# Patient Record
Sex: FEMALE | Race: WHITE | Employment: FULL TIME | URBAN - METROPOLITAN AREA
[De-identification: names, ages, dates, MRNs, and addresses within clinical notes are randomized per-mention and may not be internally consistent; named-entity substitution may affect disease eponyms.]

---

## 2019-07-13 ENCOUNTER — HOSPITAL ENCOUNTER (EMERGENCY)
Facility: CLINIC | Age: 54
Discharge: HOME | End: 2019-07-13
Attending: STUDENT IN AN ORGANIZED HEALTH CARE EDUCATION/TRAINING PROGRAM | Admitting: STUDENT IN AN ORGANIZED HEALTH CARE EDUCATION/TRAINING PROGRAM
Payer: COMMERCIAL

## 2019-07-13 VITALS
RESPIRATION RATE: 18 BRPM | DIASTOLIC BLOOD PRESSURE: 84 MMHG | WEIGHT: 293 LBS | HEIGHT: 72 IN | TEMPERATURE: 97.8 F | OXYGEN SATURATION: 99 % | BODY MASS INDEX: 39.68 KG/M2 | SYSTOLIC BLOOD PRESSURE: 158 MMHG | HEART RATE: 85 BPM

## 2019-07-13 DIAGNOSIS — S03.00XA DISLOCATION OF TEMPOROMANDIBULAR JOINT, INITIAL ENCOUNTER: ICD-10-CM

## 2019-07-13 PROCEDURE — 99283 EMERGENCY DEPT VISIT LOW MDM: CPT

## 2019-07-13 PROCEDURE — 99283 EMERGENCY DEPT VISIT LOW MDM: CPT | Mod: Z6 | Performed by: STUDENT IN AN ORGANIZED HEALTH CARE EDUCATION/TRAINING PROGRAM

## 2019-07-13 ASSESSMENT — MIFFLIN-ST. JEOR: SCORE: 2444.09

## 2019-07-13 NOTE — ED AVS SNAPSHOT
[facility]  [address] [facility] [address] [facility] [address]  Phone:  [phone]  Fax:  [phone]                                    Steffanie Wan   MRN: [id number]    Department:  [facility]   Date of Visit:  7/13/2019           After Visit Summary Signature Page    I have received my discharge instructions, and my questions have been answered. I have discussed any challenges I see with this plan with the nurse or doctor.    ..........................................................................................................................................  Patient/Patient Representative Signature      ..........................................................................................................................................  Patient Representative Print Name and Relationship to Patient    ..................................................               ................................................  Date                                   Time    ..........................................................................................................................................  Reviewed by Signature/Title    ...................................................              ..............................................  Date                                               Time          22EPIC Rev 08/18

## 2019-07-13 NOTE — ED TRIAGE NOTES
Pt states she was brushing her teeth when all of a sudden she could not close her mouth. States discomfort is greater on the left side. Denies trauma and previous incidences like this.

## 2019-07-13 NOTE — ED PROVIDER NOTES
"  History     Chief Complaint   Patient presents with     Jaw Pain     Pt states jaw pain and unable to close jaw x1.5 hours. States was brushing her teeth.     HPI  Steffanie Wan is a 53 year old female who presents for evaluation of left-sided temporal mandibular pain and inability to close her mouth.  Patient describes her jaw getting stuck open well brushing her teeth 90 minutes prior to arrival.  She denies history of similar symptoms in the past, no previous diagnosis of TMJ, and had been feeling well prior to tonight's incident.  No other injuries or trauma.    Allergies:  Allergies   Allergen Reactions     Codeine      Msg [Monosodium Glutamate]      [place] Oil [Fish Oil]        Problem List:    There are no active problems to display for this patient.       Past Medical History:    No past medical history on file.    Past Surgical History:    No past surgical history on file.    Family History:    No family history on file.    Social History:  Marital Status:  Single [1]  Social History     Tobacco Use     Smoking status: Not on file   Substance Use Topics     Alcohol use: Not on file     Drug use: Not on file        Medications:      No current outpatient medications on file.      Review of Systems  Constitutional:  Negative for fever or recent illness.  HENT: Positive for inability to close mandible.  Neurological:  Negative for headache.    All others reviewed and are negative.      Physical Exam   BP: 158/84  Pulse: 85  Temp: 97.8  F (36.6  C)  Resp: 18  Height: 189.2 cm (6' 2.5\")  Weight: (!) 167.7 kg (369 lb 12.8 oz)  SpO2: 97 %      Physical Exam  Constitutional:  Well developed, well nourished.  Appears nontoxic and in no acute distress.   HENT:  Normocephalic and atraumatic.  Eyes:  Conjunctivae are normal.  Oral: Patient appears to have a dislocated mandible with tenderness along the left TMJ.  Moist oral mucosa.  Negative pharyngeal erythema.  No exudate or soft palate petechiae.  No " uvular asymmetry.    Neck:  Neck supple.  Cardiovascular:  No cyanosis.   Respiratory:  Effort normal, no respiratory distress.   Musculoskeletal:  Moves extremities spontaneously.  Neurological:  Patient is alert.  Skin:  Skin is warm and dry.  Psychiatric:  Normal mood and affect.      ED Course        Procedures               Critical Care time:  none               No results found for this or any previous visit (from the past 24 hour(s)).    Medications - No data to display    Assessments & Plan (with Medical Decision Making)   Steffanie Wan is a 53 year old female who presents to the department for evaluation of mandibular dislocation.  The incident occurred while she was opening her mouth widely to brush her teeth prior to arrival.  The mandible was easily reduced in the department and patient was able to range afterwards.  Instructed her to refrain from opening widely over the next few days, follow-up with local dentist, and return to the department for any developing symptoms or concerns.        Disclaimer:  This note consists of symbols derived from keyboarding, dictation, and/or voice recognition software.  As a result, there may be errors in the script that have gone undetected.  Please consider this when interpreting information found in the chart.        I have reviewed the nursing notes.    I have reviewed the findings, diagnosis, plan and need for follow up with the patient.          Medication List      There are no discharge medications for this visit.         Final diagnoses:   Dislocation of temporomandibular joint, initial encounter       7/13/2019   [facility] EMERGENCY DEPARTMENT     Dmitry Davis,   07/13/19 0445

## 2021-12-02 ENCOUNTER — HOSPITAL ENCOUNTER (INPATIENT)
Facility: CLINIC | Age: 56
LOS: 4 days | Discharge: HOME | End: 2021-12-06
Attending: NURSE PRACTITIONER | Admitting: HOSPITALIST
Payer: COMMERCIAL

## 2021-12-02 ENCOUNTER — APPOINTMENT (OUTPATIENT)
Dept: GENERAL RADIOLOGY | Facility: CLINIC | Age: 56
End: 2021-12-02
Attending: INTERNAL MEDICINE
Payer: COMMERCIAL

## 2021-12-02 ENCOUNTER — APPOINTMENT (OUTPATIENT)
Dept: CT IMAGING | Facility: CLINIC | Age: 56
End: 2021-12-02
Attending: NURSE PRACTITIONER
Payer: COMMERCIAL

## 2021-12-02 DIAGNOSIS — J96.00 ACUTE RESPIRATORY FAILURE DUE TO COVID-19 (H): ICD-10-CM

## 2021-12-02 DIAGNOSIS — U07.1 ACUTE RESPIRATORY FAILURE DUE TO COVID-19 (H): ICD-10-CM

## 2021-12-02 DIAGNOSIS — R09.02 HYPOXIA: ICD-10-CM

## 2021-12-02 DIAGNOSIS — U07.1 INFECTION DUE TO 2019 NOVEL CORONAVIRUS: ICD-10-CM

## 2021-12-02 PROBLEM — M65.28 CALCIFIC ACHILLES TENDINITIS: Status: ACTIVE | Noted: 2019-05-13

## 2021-12-02 LAB
ALBUMIN SERPL-MCNC: 3.2 G/DL (ref 3.4–5)
ALP SERPL-CCNC: 55 U/L (ref 40–150)
ALT SERPL W P-5'-P-CCNC: 36 U/L (ref 0–50)
ANION GAP SERPL CALCULATED.3IONS-SCNC: 9 MMOL/L (ref 3–14)
AST SERPL W P-5'-P-CCNC: 39 U/L (ref 0–45)
BASE EXCESS BLDV CALC-SCNC: 3.3 MMOL/L (ref -7.7–1.9)
BASOPHILS # BLD AUTO: 0 10E3/UL (ref 0–0.2)
BASOPHILS NFR BLD AUTO: 0 %
BILIRUB SERPL-MCNC: 0.9 MG/DL (ref 0.2–1.3)
BUN SERPL-MCNC: 24 MG/DL (ref 7–30)
CALCIUM SERPL-MCNC: 8.7 MG/DL (ref 8.5–10.1)
CHLORIDE BLD-SCNC: 99 MMOL/L (ref 94–109)
CO2 SERPL-SCNC: 27 MMOL/L (ref 20–32)
CREAT SERPL-MCNC: 1.39 MG/DL (ref 0.52–1.04)
CRP SERPL-MCNC: 90.6 MG/L (ref 0–8)
D DIMER PPP FEU-MCNC: 0.89 UG/ML FEU (ref 0–0.5)
EOSINOPHIL # BLD AUTO: 0 10E3/UL (ref 0–0.7)
EOSINOPHIL NFR BLD AUTO: 0 %
ERYTHROCYTE [DISTWIDTH] IN BLOOD BY AUTOMATED COUNT: 14.1 % (ref 10–15)
FERRITIN SERPL-MCNC: 1036 NG/ML (ref 8–252)
FIBRINOGEN PPP-MCNC: 471 MG/DL (ref 170–490)
FLUAV RNA SPEC QL NAA+PROBE: NEGATIVE
FLUBV RNA RESP QL NAA+PROBE: NEGATIVE
GFR SERPL CREATININE-BSD FRML MDRD: 43 ML/MIN/1.73M2
GLUCOSE BLD-MCNC: 112 MG/DL (ref 70–99)
HCO3 BLDV-SCNC: 28 MMOL/L (ref 21–28)
HCT VFR BLD AUTO: 45 % (ref 35–47)
HGB BLD-MCNC: 15.2 G/DL (ref 11.7–15.7)
IMM GRANULOCYTES # BLD: 0 10E3/UL
IMM GRANULOCYTES NFR BLD: 0 %
LACTATE SERPL-SCNC: 1.5 MMOL/L (ref 0.7–2)
LDH SERPL L TO P-CCNC: 451 U/L (ref 81–234)
LYMPHOCYTES # BLD AUTO: 1.2 10E3/UL (ref 0.8–5.3)
LYMPHOCYTES NFR BLD AUTO: 29 %
MAGNESIUM SERPL-MCNC: 2.5 MG/DL (ref 1.6–2.3)
MCH RBC QN AUTO: 29.2 PG (ref 26.5–33)
MCHC RBC AUTO-ENTMCNC: 33.8 G/DL (ref 31.5–36.5)
MCV RBC AUTO: 86 FL (ref 78–100)
MONOCYTES # BLD AUTO: 0.3 10E3/UL (ref 0–1.3)
MONOCYTES NFR BLD AUTO: 8 %
NEUTROPHILS # BLD AUTO: 2.5 10E3/UL (ref 1.6–8.3)
NEUTROPHILS NFR BLD AUTO: 63 %
NRBC # BLD AUTO: 0 10E3/UL
NRBC BLD AUTO-RTO: 0 /100
O2/TOTAL GAS SETTING VFR VENT: 28 %
PCO2 BLDV: 41 MM HG (ref 40–50)
PH BLDV: 7.44 [PH] (ref 7.32–7.43)
PLATELET # BLD AUTO: 112 10E3/UL (ref 150–450)
PO2 BLDV: 39 MM HG (ref 25–47)
POTASSIUM BLD-SCNC: 3.8 MMOL/L (ref 3.4–5.3)
PROT SERPL-MCNC: 7.8 G/DL (ref 6.8–8.8)
RBC # BLD AUTO: 5.21 10E6/UL (ref 3.8–5.2)
SARS-COV-2 RNA RESP QL NAA+PROBE: POSITIVE
SODIUM SERPL-SCNC: 135 MMOL/L (ref 133–144)
TROPONIN I SERPL HS-MCNC: 14 NG/L
TSH SERPL DL<=0.005 MIU/L-ACNC: 1.13 MU/L (ref 0.4–4)
WBC # BLD AUTO: 4 10E3/UL (ref 4–11)

## 2021-12-02 PROCEDURE — 85384 FIBRINOGEN ACTIVITY: CPT | Performed by: INTERNAL MEDICINE

## 2021-12-02 PROCEDURE — 96361 HYDRATE IV INFUSION ADD-ON: CPT

## 2021-12-02 PROCEDURE — 120N000001 HC R&B MED SURG/OB

## 2021-12-02 PROCEDURE — 271N000002 HC RX 271: Performed by: NURSE PRACTITIONER

## 2021-12-02 PROCEDURE — 99285 EMERGENCY DEPT VISIT HI MDM: CPT | Mod: 25

## 2021-12-02 PROCEDURE — 96375 TX/PRO/DX INJ NEW DRUG ADDON: CPT

## 2021-12-02 PROCEDURE — 71045 X-RAY EXAM CHEST 1 VIEW: CPT

## 2021-12-02 PROCEDURE — 83615 LACTATE (LD) (LDH) ENZYME: CPT | Performed by: NURSE PRACTITIONER

## 2021-12-02 PROCEDURE — 83735 ASSAY OF MAGNESIUM: CPT | Performed by: NURSE PRACTITIONER

## 2021-12-02 PROCEDURE — 84484 ASSAY OF TROPONIN QUANT: CPT | Performed by: NURSE PRACTITIONER

## 2021-12-02 PROCEDURE — 36415 COLL VENOUS BLD VENIPUNCTURE: CPT | Performed by: NURSE PRACTITIONER

## 2021-12-02 PROCEDURE — 83605 ASSAY OF LACTIC ACID: CPT | Performed by: NURSE PRACTITIONER

## 2021-12-02 PROCEDURE — 85025 COMPLETE CBC W/AUTO DIFF WBC: CPT | Performed by: NURSE PRACTITIONER

## 2021-12-02 PROCEDURE — C9803 HOPD COVID-19 SPEC COLLECT: HCPCS

## 2021-12-02 PROCEDURE — 71275 CT ANGIOGRAPHY CHEST: CPT

## 2021-12-02 PROCEDURE — 84145 PROCALCITONIN (PCT): CPT | Performed by: NURSE PRACTITIONER

## 2021-12-02 PROCEDURE — 82803 BLOOD GASES ANY COMBINATION: CPT | Performed by: NURSE PRACTITIONER

## 2021-12-02 PROCEDURE — 86140 C-REACTIVE PROTEIN: CPT | Performed by: NURSE PRACTITIONER

## 2021-12-02 PROCEDURE — 99285 EMERGENCY DEPT VISIT HI MDM: CPT | Performed by: EMERGENCY MEDICINE

## 2021-12-02 PROCEDURE — 250N000013 HC RX MED GY IP 250 OP 250 PS 637: Performed by: NURSE PRACTITIONER

## 2021-12-02 PROCEDURE — 258N000003 HC RX IP 258 OP 636: Performed by: NURSE PRACTITIONER

## 2021-12-02 PROCEDURE — 84443 ASSAY THYROID STIM HORMONE: CPT | Performed by: NURSE PRACTITIONER

## 2021-12-02 PROCEDURE — 87636 SARSCOV2 & INF A&B AMP PRB: CPT | Performed by: NURSE PRACTITIONER

## 2021-12-02 PROCEDURE — 250N000011 HC RX IP 250 OP 636: Performed by: NURSE PRACTITIONER

## 2021-12-02 PROCEDURE — 80053 COMPREHEN METABOLIC PANEL: CPT | Performed by: NURSE PRACTITIONER

## 2021-12-02 PROCEDURE — 250N000009 HC RX 250: Performed by: NURSE PRACTITIONER

## 2021-12-02 PROCEDURE — 82728 ASSAY OF FERRITIN: CPT | Performed by: NURSE PRACTITIONER

## 2021-12-02 PROCEDURE — 85379 FIBRIN DEGRADATION QUANT: CPT | Performed by: NURSE PRACTITIONER

## 2021-12-02 PROCEDURE — 96374 THER/PROPH/DIAG INJ IV PUSH: CPT

## 2021-12-02 RX ORDER — IOPAMIDOL 755 MG/ML
96 INJECTION, SOLUTION INTRAVASCULAR ONCE
Status: COMPLETED | OUTPATIENT
Start: 2021-12-02 | End: 2021-12-02

## 2021-12-02 RX ORDER — ACETAMINOPHEN 500 MG
1000 TABLET ORAL ONCE
Status: COMPLETED | OUTPATIENT
Start: 2021-12-02 | End: 2021-12-02

## 2021-12-02 RX ORDER — LEVOTHYROXINE SODIUM 100 UG/1
100 TABLET ORAL DAILY
COMMUNITY

## 2021-12-02 RX ORDER — ATORVASTATIN CALCIUM 20 MG/1
20 TABLET, FILM COATED ORAL AT BEDTIME
COMMUNITY
Start: 2021-11-02

## 2021-12-02 RX ORDER — GUAIFENESIN 600 MG/1
1200 TABLET, EXTENDED RELEASE ORAL ONCE
Status: COMPLETED | OUTPATIENT
Start: 2021-12-02 | End: 2021-12-02

## 2021-12-02 RX ORDER — DEXAMETHASONE SODIUM PHOSPHATE 10 MG/ML
6 INJECTION, SOLUTION INTRAMUSCULAR; INTRAVENOUS ONCE
Status: COMPLETED | OUTPATIENT
Start: 2021-12-02 | End: 2021-12-02

## 2021-12-02 RX ORDER — ALBUTEROL SULFATE 90 UG/1
6 AEROSOL, METERED RESPIRATORY (INHALATION) EVERY 6 HOURS PRN
Status: DISCONTINUED | OUTPATIENT
Start: 2021-12-02 | End: 2021-12-06 | Stop reason: HOSPADM

## 2021-12-02 RX ORDER — ONDANSETRON 4 MG/1
4 TABLET, ORALLY DISINTEGRATING ORAL ONCE
Status: COMPLETED | OUTPATIENT
Start: 2021-12-02 | End: 2021-12-02

## 2021-12-02 RX ORDER — INHALER, ASSIST DEVICES
1 SPACER (EA) MISCELLANEOUS ONCE
Status: COMPLETED | OUTPATIENT
Start: 2021-12-02 | End: 2021-12-02

## 2021-12-02 RX ADMIN — IOPAMIDOL 96 ML: 755 INJECTION, SOLUTION INTRAVENOUS at 21:52

## 2021-12-02 RX ADMIN — Medication 1 EACH: at 19:31

## 2021-12-02 RX ADMIN — SODIUM CHLORIDE 100 ML: 9 INJECTION, SOLUTION INTRAVENOUS at 21:52

## 2021-12-02 RX ADMIN — ACETAMINOPHEN 1000 MG: 500 TABLET, FILM COATED ORAL at 19:34

## 2021-12-02 RX ADMIN — ONDANSETRON 4 MG: 4 TABLET, ORALLY DISINTEGRATING ORAL at 19:30

## 2021-12-02 RX ADMIN — DEXAMETHASONE SODIUM PHOSPHATE 6 MG: 10 INJECTION INTRAMUSCULAR; INTRAVENOUS at 21:09

## 2021-12-02 RX ADMIN — ALBUTEROL SULFATE 6 PUFF: 90 AEROSOL, METERED RESPIRATORY (INHALATION) at 19:31

## 2021-12-02 RX ADMIN — GUAIFENESIN 1200 MG: 600 TABLET ORAL at 19:33

## 2021-12-02 RX ADMIN — SODIUM CHLORIDE 500 ML: 9 INJECTION, SOLUTION INTRAVENOUS at 21:25

## 2021-12-02 ASSESSMENT — MIFFLIN-ST. JEOR: SCORE: 2437.94

## 2021-12-02 ASSESSMENT — ACTIVITIES OF DAILY LIVING (ADL)
ADLS_ACUITY_SCORE: 5

## 2021-12-02 NOTE — ED TRIAGE NOTES
States she feels very ill with body aches/ nausea and vomiting for the lat 10 days. She denies fevers. Not Covid vaccinated and cannot be tested for Yazdanism reasons. Active cough.

## 2021-12-03 LAB — PROCALCITONIN SERPL-MCNC: 0.09 NG/ML

## 2021-12-03 PROCEDURE — 120N000001 HC R&B MED SURG/OB

## 2021-12-03 PROCEDURE — 250N000012 HC RX MED GY IP 250 OP 636 PS 637: Performed by: NURSE PRACTITIONER

## 2021-12-03 PROCEDURE — 250N000013 HC RX MED GY IP 250 OP 250 PS 637: Performed by: EMERGENCY MEDICINE

## 2021-12-03 PROCEDURE — 250N000009 HC RX 250: Performed by: FAMILY MEDICINE

## 2021-12-03 PROCEDURE — 250N000011 HC RX IP 250 OP 636: Performed by: EMERGENCY MEDICINE

## 2021-12-03 RX ORDER — ACETAMINOPHEN 500 MG
1000 TABLET ORAL EVERY 6 HOURS PRN
Status: DISCONTINUED | OUTPATIENT
Start: 2021-12-03 | End: 2021-12-06 | Stop reason: HOSPADM

## 2021-12-03 RX ORDER — GUAIFENESIN 600 MG/1
1200 TABLET, EXTENDED RELEASE ORAL 2 TIMES DAILY PRN
Status: DISCONTINUED | OUTPATIENT
Start: 2021-12-03 | End: 2021-12-06 | Stop reason: HOSPADM

## 2021-12-03 RX ORDER — TIMOLOL MALEATE 2.5 MG/ML
1 SOLUTION/ DROPS OPHTHALMIC 2 TIMES DAILY
Status: DISCONTINUED | OUTPATIENT
Start: 2021-12-03 | End: 2021-12-06 | Stop reason: HOSPADM

## 2021-12-03 RX ORDER — ATORVASTATIN CALCIUM 20 MG/1
20 TABLET, FILM COATED ORAL EVERY EVENING
Status: DISCONTINUED | OUTPATIENT
Start: 2021-12-03 | End: 2021-12-04

## 2021-12-03 RX ORDER — DROPERIDOL 2.5 MG/ML
1.25 INJECTION, SOLUTION INTRAMUSCULAR; INTRAVENOUS ONCE
Status: COMPLETED | OUTPATIENT
Start: 2021-12-03 | End: 2021-12-03

## 2021-12-03 RX ORDER — TIMOLOL MALEATE 2.5 MG/ML
1 SOLUTION/ DROPS OPHTHALMIC 2 TIMES DAILY
COMMUNITY

## 2021-12-03 RX ORDER — INHALER, ASSIST DEVICES
1 SPACER (EA) MISCELLANEOUS ONCE
Status: COMPLETED | OUTPATIENT
Start: 2021-12-03 | End: 2021-12-03

## 2021-12-03 RX ORDER — ATORVASTATIN CALCIUM 20 MG/1
20 TABLET, FILM COATED ORAL AT BEDTIME
Status: DISCONTINUED | OUTPATIENT
Start: 2021-12-03 | End: 2021-12-03

## 2021-12-03 RX ORDER — LEVOTHYROXINE SODIUM 100 UG/1
100 TABLET ORAL
Status: DISCONTINUED | OUTPATIENT
Start: 2021-12-03 | End: 2021-12-06 | Stop reason: HOSPADM

## 2021-12-03 RX ORDER — LEVOTHYROXINE SODIUM 100 UG/1
100 TABLET ORAL DAILY
Status: DISCONTINUED | OUTPATIENT
Start: 2021-12-03 | End: 2021-12-03

## 2021-12-03 RX ORDER — ACETAMINOPHEN 500 MG
500 TABLET ORAL ONCE
Status: COMPLETED | OUTPATIENT
Start: 2021-12-03 | End: 2021-12-03

## 2021-12-03 RX ORDER — ONDANSETRON 4 MG/1
4 TABLET, ORALLY DISINTEGRATING ORAL EVERY 6 HOURS PRN
Status: DISCONTINUED | OUTPATIENT
Start: 2021-12-03 | End: 2021-12-06 | Stop reason: HOSPADM

## 2021-12-03 RX ORDER — DEXAMETHASONE SODIUM PHOSPHATE 10 MG/ML
6 INJECTION, SOLUTION INTRAMUSCULAR; INTRAVENOUS ONCE
Status: DISCONTINUED | OUTPATIENT
Start: 2021-12-04 | End: 2021-12-03

## 2021-12-03 RX ADMIN — ATORVASTATIN CALCIUM 20 MG: 20 TABLET, FILM COATED ORAL at 19:01

## 2021-12-03 RX ADMIN — DROPERIDOL 1.25 MG: 2.5 INJECTION, SOLUTION INTRAMUSCULAR; INTRAVENOUS at 05:49

## 2021-12-03 RX ADMIN — ENOXAPARIN SODIUM 40 MG: 100 INJECTION SUBCUTANEOUS at 19:02

## 2021-12-03 RX ADMIN — TIMOLOL MALEATE 1 DROP: 2.5 SOLUTION/ DROPS OPHTHALMIC at 20:39

## 2021-12-03 RX ADMIN — Medication 1 EACH: at 20:38

## 2021-12-03 RX ADMIN — ALBUTEROL SULFATE 6 PUFF: 90 AEROSOL, METERED RESPIRATORY (INHALATION) at 20:40

## 2021-12-03 RX ADMIN — LEVOTHYROXINE SODIUM 100 MCG: 100 TABLET ORAL at 08:57

## 2021-12-03 RX ADMIN — ACETAMINOPHEN 500 MG: 500 TABLET, FILM COATED ORAL at 01:47

## 2021-12-03 RX ADMIN — DEXAMETHASONE 6 MG: 2 TABLET ORAL at 20:38

## 2021-12-03 RX ADMIN — ENOXAPARIN SODIUM 40 MG: 100 INJECTION SUBCUTANEOUS at 08:58

## 2021-12-03 ASSESSMENT — ACTIVITIES OF DAILY LIVING (ADL)
ADLS_ACUITY_SCORE: 5

## 2021-12-03 ASSESSMENT — MIFFLIN-ST. JEOR
SCORE: 2437.94
SCORE: 2048.76

## 2021-12-03 NOTE — ED NOTES
Talked to Zahraa Crow CNP about the current blood pressure, diaphoresis and feeling ill and lightheaded. Orders received for IV fluids.

## 2021-12-03 NOTE — ED PROVIDER NOTES
"     Emergency Department Patient Sign-out       Brief HPI:  This is a 56 year old female signed out to me by Dr. Chandler .  See initial ED Provider note for details of the presentation.     Presents patient with underlying obesity, who is traveling to the area diagnosed with Covid pneumonia.  Has hypoxia.  Tolerating O2 3 L without significant desaturation.  Has received Decadron.  No remdesivir.  Pending bed      Significant Events prior to my assuming care:      Exam:   Patient Vitals for the past 24 hrs:   BP Temp Temp src Pulse Resp SpO2 Height Weight   12/03/21 1442 -- -- -- -- -- 92 % -- --   12/03/21 1415 -- -- -- -- -- (!) 88 % -- --   12/03/21 1400 -- -- -- -- -- 92 % -- --   12/03/21 1300 -- -- -- -- -- 94 % -- --   12/03/21 1200 -- -- -- -- -- 91 % -- --   12/03/21 1100 -- -- -- -- -- 92 % -- --   12/03/21 1000 -- -- -- -- -- 94 % -- --   12/03/21 0900 125/62 -- -- 84 -- 92 % -- --   12/03/21 0730 -- -- -- -- -- 93 % -- --   12/03/21 0715 -- -- -- -- -- 91 % -- --   12/03/21 0700 -- -- -- -- -- 92 % -- --   12/03/21 0645 -- -- -- -- -- 91 % -- --   12/03/21 0630 -- -- -- -- -- 97 % -- --   12/03/21 0445 -- -- -- -- -- 93 % -- --   12/03/21 0430 -- -- -- -- -- 95 % -- --   12/03/21 0330 -- -- -- -- -- 93 % -- --   12/03/21 0315 -- -- -- -- -- 93 % -- --   12/03/21 0300 -- -- -- -- -- 95 % -- --   12/03/21 0249 -- -- -- -- -- -- 1.905 m (6' 3\") (!) 167.8 kg (370 lb)   12/03/21 0248 -- -- -- -- -- -- 1.651 m (5' 5\") 144.8 kg (319 lb 3.2 oz)   12/03/21 0245 -- -- -- -- -- 92 % -- --   12/03/21 0238 -- 97.8  F (36.6  C) Tympanic -- -- 94 % -- --   12/03/21 0230 -- -- -- -- -- 92 % -- --   12/03/21 0215 -- -- -- -- -- 95 % -- --   12/03/21 0200 -- -- -- -- -- 97 % -- --   12/03/21 0145 107/64 -- -- -- 20 96 % -- --   12/03/21 0130 -- -- -- -- -- 92 % -- --   12/03/21 0115 -- -- -- -- -- 92 % -- --   12/03/21 0100 -- 100.4  F (38  C) Oral -- -- 95 % -- --   12/03/21 0045 -- -- -- -- -- 92 % -- --   12/03/21 0030 " "-- -- -- -- -- 95 % -- --   12/03/21 0015 116/66 -- -- -- -- 92 % -- --   12/03/21 0000 (!) 89/40 -- -- 78 -- 95 % -- --   12/02/21 2345 113/61 -- -- 77 -- 97 % -- --   12/02/21 2330 117/52 -- -- 78 -- 93 % -- --   12/02/21 2315 111/49 -- -- 74 -- 94 % -- --   12/02/21 2300 113/66 -- -- 78 -- 90 % -- --   12/02/21 2245 119/55 -- -- 78 -- 95 % -- --   12/02/21 2230 100/48 -- -- 76 -- 97 % -- --   12/02/21 2227 100/48 -- -- 76 20 96 % -- --   12/02/21 2150 -- -- -- -- -- 94 % -- --   12/02/21 2145 100/53 -- -- 84 -- 97 % -- --   12/02/21 2130 108/54 -- -- 86 20 96 % -- --   12/02/21 2113 (!) 89/59 -- -- 88 20 95 % -- --   12/02/21 2112 (!) 81/51 -- -- 82 20 95 % -- --   12/02/21 2110 (!) 82/41 -- -- 81 20 96 % -- --   12/02/21 2054 128/53 -- -- 92 20 93 % -- --   12/02/21 2001 -- -- -- -- 20 92 % -- --   12/02/21 1958 -- -- -- -- 20 92 % -- --   12/02/21 1951 -- -- -- -- -- 91 % -- --   12/02/21 1950 -- -- -- -- 18 (!) 88 % -- --   12/02/21 1947 -- -- -- -- 18 (!) 87 % -- --   12/02/21 1946 -- -- -- -- 18 90 % -- --   12/02/21 1934 -- -- -- -- -- 95 % -- --   12/02/21 1930 -- -- -- -- -- 91 % -- --   12/02/21 1929 -- -- -- -- -- 90 % -- --   12/02/21 1915 -- -- -- -- -- 91 % -- --   12/02/21 1900 -- -- -- -- -- 92 % -- --   12/02/21 1726 105/59 99  F (37.2  C) Oral 97 20 93 % 1.905 m (6' 3\") (!) 167.8 kg (370 lb)           ED RESULTS:   Results for orders placed or performed during the hospital encounter of 12/02/21 (from the past 24 hour(s))   Symptomatic Influenza A/B & SARS-CoV2 (COVID-19) Virus PCR Multiplex Nasopharyngeal     Status: Abnormal    Collection Time: 12/02/21  6:39 PM    Specimen: Nasopharyngeal; Swab   Result Value Ref Range    Influenza A PCR Negative Negative    Influenza B PCR Negative Negative    SARS CoV2 PCR Positive (A) Negative    Narrative    Testing was performed using the laura SARS-CoV-2 & Influenza A/B Assay on the laura Karen System. This test should be ordered for the detection of " SARS-CoV-2 and influenza viruses in individuals who meet clinical and/or epidemiological criteria. Test performance is unknown in asymptomatic patients. This test is for in vitro diagnostic use under the FDA EUA for laboratories certified under CLIA to perform moderate and/or high complexity testing. This test has not been FDA cleared or approved. A negative result does not rule out the presence of PCR inhibitors in the specimen or target RNA in concentration below the limit of detection for the assay. If only one viral target is positive but coinfection with multiple targets is suspected, the sample should be re-tested with another FDA cleared, approved or authorized test, if coinfection would change clinical management. [facility] are certified under the Clinical Laboratory Improvement Amendments of 1988 (CLIA-88) as  qualified to perform moderate and/or high complexity laboratory testing.   Lactic acid whole blood     Status: Normal    Collection Time: 12/02/21  9:03 PM   Result Value Ref Range    Lactic Acid 1.5 0.7 - 2.0 mmol/L   Blood gas venous     Status: Abnormal    Collection Time: 12/02/21  9:03 PM   Result Value Ref Range    pH Venous 7.44 (H) 7.32 - 7.43    pCO2 Venous 41 40 - 50 mm Hg    pO2 Venous 39 25 - 47 mm Hg    Bicarbonate Venous 28 21 - 28 mmol/L    Base Excess/Deficit (+/-) 3.3 (H) -7.7 - 1.9 mmol/L    FIO2 28    CBC with platelets differential     Status: Abnormal    Collection Time: 12/02/21  9:04 PM    Narrative    The following orders were created for panel order CBC with platelets differential.  Procedure                               Abnormality         Status                     ---------                               -----------         ------                     CBC with platelets and d...[[id number]]  Abnormal            Final result                 Please view results for these tests on the individual orders.   Comprehensive metabolic panel     Status: Abnormal     Collection Time: 12/02/21  9:04 PM   Result Value Ref Range    Sodium 135 133 - 144 mmol/L    Potassium 3.8 3.4 - 5.3 mmol/L    Chloride 99 94 - 109 mmol/L    Carbon Dioxide (CO2) 27 20 - 32 mmol/L    Anion Gap 9 3 - 14 mmol/L    Urea Nitrogen 24 7 - 30 mg/dL    Creatinine 1.39 (H) 0.52 - 1.04 mg/dL    Calcium 8.7 8.5 - 10.1 mg/dL    Glucose 112 (H) 70 - 99 mg/dL    Alkaline Phosphatase 55 40 - 150 U/L    AST 39 0 - 45 U/L    ALT 36 0 - 50 U/L    Protein Total 7.8 6.8 - 8.8 g/dL    Albumin 3.2 (L) 3.4 - 5.0 g/dL    Bilirubin Total 0.9 0.2 - 1.3 mg/dL    GFR Estimate 43 (L) >60 mL/min/1.73m2   Magnesium     Status: Abnormal    Collection Time: 12/02/21  9:04 PM   Result Value Ref Range    Magnesium 2.5 (H) 1.6 - 2.3 mg/dL   Troponin I     Status: Normal    Collection Time: 12/02/21  9:04 PM   Result Value Ref Range    Troponin I High Sensitivity 14 <54 ng/L   Procalcitonin     Status: Abnormal    Collection Time: 12/02/21  9:04 PM   Result Value Ref Range    Procalcitonin 0.09 (H) <0.05 ng/mL   CRP inflammation     Status: Abnormal    Collection Time: 12/02/21  9:04 PM   Result Value Ref Range    CRP Inflammation 90.6 (H) 0.0 - 8.0 mg/L   Ferritin     Status: Abnormal    Collection Time: 12/02/21  9:04 PM   Result Value Ref Range    Ferritin 1,036 (H) 8 - 252 ng/mL   Lactate Dehydrogenase     Status: Abnormal    Collection Time: 12/02/21  9:04 PM   Result Value Ref Range    Lactate Dehydrogenase 451 (H) 81 - 234 U/L   TSH with free T4 reflex     Status: Normal    Collection Time: 12/02/21  9:04 PM   Result Value Ref Range    TSH 1.13 0.40 - 4.00 mU/L   CBC with platelets and differential     Status: Abnormal    Collection Time: 12/02/21  9:04 PM   Result Value Ref Range    WBC Count 4.0 4.0 - 11.0 10e3/uL    RBC Count 5.21 (H) 3.80 - 5.20 10e6/uL    Hemoglobin 15.2 11.7 - 15.7 g/dL    Hematocrit 45.0 35.0 - 47.0 %    MCV 86 78 - 100 fL    MCH 29.2 26.5 - 33.0 pg    MCHC 33.8 31.5 - 36.5 g/dL    RDW 14.1 10.0 - 15.0 %     Platelet Count 112 (L) 150 - 450 10e3/uL    % Neutrophils 63 %    % Lymphocytes 29 %    % Monocytes 8 %    % Eosinophils 0 %    % Basophils 0 %    % Immature Granulocytes 0 %    NRBCs per 100 WBC 0 <1 /100    Absolute Neutrophils 2.5 1.6 - 8.3 10e3/uL    Absolute Lymphocytes 1.2 0.8 - 5.3 10e3/uL    Absolute Monocytes 0.3 0.0 - 1.3 10e3/uL    Absolute Eosinophils 0.0 0.0 - 0.7 10e3/uL    Absolute Basophils 0.0 0.0 - 0.2 10e3/uL    Absolute Immature Granulocytes 0.0 <=0.4 10e3/uL    Absolute NRBCs 0.0 10e3/uL   D dimer quantitative     Status: Abnormal    Collection Time: 12/02/21  9:05 PM   Result Value Ref Range    D-Dimer Quantitative 0.89 (H) 0.00 - 0.50 ug/mL FEU    Narrative    This D-dimer assay is intended for use in conjunction with a clinical pretest probability assessment model to exclude pulmonary embolism (PE) and deep venous thrombosis (DVT) in outpatients suspected of PE or DVT. The cut-off value is 0.50 ug/mL FEU.   Fibrinogen activity     Status: Normal    Collection Time: 12/02/21  9:05 PM   Result Value Ref Range    Fibrinogen Activity 471 170 - 490 mg/dL   XR Chest Port 1 View     Status: None    Collection Time: 12/02/21  9:20 PM    Narrative    EXAM: XR CHEST PORT 1 VIEW  LOCATION: [facility]  DATE/TIME: 12/2/2021 9:08 PM    INDICATION: sob, covid positive  COMPARISON: None.      Impression    IMPRESSION: Heart size and pulmonary vascularity normal. Ill-defined nodular and hazy opacities both upper and lower lung fields concerning for pneumonia, including Covid 19. No effusions.   CT Chest Pulmonary Embolism w Contrast     Status: None    Collection Time: 12/02/21 10:13 PM    Narrative    EXAM: CT CHEST PULMONARY EMBOLISM W CONTRAST  LOCATION: [facility]  DATE/TIME: 12/2/2021 9:53 PM    INDICATION: PE suspected, low/intermediate prob, positive D-dimer  COMPARISON: None.  TECHNIQUE: CT chest pulmonary angiogram during arterial phase  injection of IV contrast. Multiplanar reformats and MIP reconstructions were performed. Dose reduction techniques were used.   CONTRAST: 96 ml Isovue 370    FINDINGS:  ANGIOGRAM CHEST: No evidence for pulmonary embolism. Pulmonary arteries normal in caliber. Thoracic aorta normal in caliber. No aortic dissection or other acute abnormality.    HEART: Cardiac chambers within normal limits. No pericardial effusion. No coronary artery calcification.    MEDIASTINUM: No adenopathy or mass.    LUNGS AND PLEURA: Moderate scattered lobular and subsegmental groundglass densities throughout the lungs, becoming slightly more confluent at the lung bases. Numerous scattered pulmonary cysts also randomly distributed throughout the lungs. No   pneumothorax. No pleural effusion.    LIMITED UPPER ABDOMEN: Hepatic steatosis. Mild hepatomegaly.    MUSCULOSKELETAL: Negative.      Impression    IMPRESSION:  1.  No evidence for pulmonary embolism.   2.  Moderate Covid 19 pneumonia.  3.  Numerous pulmonary cysts. 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 Consider follow-up pulmonary consultation.       ED MEDICATIONS:   Medications   albuterol (PROVENTIL HFA/VENTOLIN HFA) inhaler (6 puffs Inhalation Given 12/2/21 1931)   sodium chloride (PF) 0.9% PF flush 3 mL (has no administration in time range)   sodium chloride (PF) 0.9% PF flush 3 mL (3 mLs Intracatheter Given 12/3/21 2721)   acetaminophen (TYLENOL) tablet 1,000 mg (has no administration in time range)   guaiFENesin (MUCINEX) 12 hr tablet 1,200 mg (has no administration in time range)   ondansetron (ZOFRAN-ODT) ODT tab 4 mg (has no administration in time range)   aerochamber with mouthpiece (NO mask) - > 5 years 1 each (has no administration in time range)   atorvastatin (LIPITOR) tablet 20 mg (has no administration in time range)   levothyroxine (SYNTHROID/LEVOTHROID) tablet 100 mcg (has no administration in time range)   dexamethasone (DECADRON) tablet 6  mg (has no administration in time range)   atorvastatin (LIPITOR) tablet 20 mg (has no administration in time range)   levothyroxine (SYNTHROID/LEVOTHROID) tablet 100 mcg (100 mcg Oral Given 12/3/21 0857)   enoxaparin ANTICOAGULANT (LOVENOX) injection 40 mg (40 mg Subcutaneous Given 12/3/21 0858)   ondansetron (ZOFRAN-ODT) ODT tab 4 mg (4 mg Oral Given 12/2/21 1930)   acetaminophen (TYLENOL) tablet 1,000 mg (1,000 mg Oral Given 12/2/21 1934)   guaiFENesin (MUCINEX) 12 hr tablet 1,200 mg (1,200 mg Oral Given 12/2/21 1933)   aerochamber with mouthpiece (NO mask) - > 5 years 1 each (1 each Inhalation Given 12/2/21 1931)   dexamethasone PF (DECADRON) injection 6 mg (6 mg Intravenous Given 12/2/21 2109)   0.9% sodium chloride BOLUS (0 mLs Intravenous Stopped 12/2/21 2230)   iopamidol (ISOVUE-370) solution 96 mL (96 mLs Intravenous Given 12/2/21 2152)   sodium chloride 0.9 % bag 500mL for CT scan flush use (100 mLs As instructed Given 12/2/21 2152)   acetaminophen (TYLENOL) tablet 500 mg (500 mg Oral Given 12/3/21 0147)   droperidol (INAPSINE) injection 1.25 mg (1.25 mg Intravenous Given 12/3/21 0549)         Impression:    ICD-10-CM    1. Infection due to 2019 novel coronavirus  U07.1    2. Acute respiratory failure due to COVID-19 (H)  U07.1     J96.00    3. Hypoxia  R09.02        Plan:    Pending studies include none.  Awaiting hospital bed.         ED to Inpatient Handoff:    Discussed with Dr. Del Valle   Patient accepted for Inpatient Stay  Pending studies include none  Code Status: Not Addressed             MD Macario Verduzco Scott J, MD  12/04/21 8425

## 2021-12-03 NOTE — ED PROVIDER NOTES
"     Emergency Department Patient Sign-out       Brief HPI:  This is a 56 year old female signed out to me by Dr. Powell .  See initial ED Provider note for details of the presentation.            Significant Events prior to my assuming care: 56-year-old female who is unvaccinated for COVID-19 and presents with COVID19 and hypoxic respiratory failure requiring oxygen at 2 L/min via nasal cannula      Exam:   Patient Vitals for the past 24 hrs:   BP Temp Temp src Pulse Resp SpO2 Height Weight   12/03/21 0445 -- -- -- -- -- 93 % -- --   12/03/21 0430 -- -- -- -- -- 95 % -- --   12/03/21 0330 -- -- -- -- -- 93 % -- --   12/03/21 0315 -- -- -- -- -- 93 % -- --   12/03/21 0300 -- -- -- -- -- 95 % -- --   12/03/21 0249 -- -- -- -- -- -- 1.905 m (6' 3\") (!) 167.8 kg (370 lb)   12/03/21 0248 -- -- -- -- -- -- 1.651 m (5' 5\") 144.8 kg (319 lb 3.2 oz)   12/03/21 0245 -- -- -- -- -- 92 % -- --   12/03/21 0238 -- 97.8  F (36.6  C) Tympanic -- -- 94 % -- --   12/03/21 0230 -- -- -- -- -- 92 % -- --   12/03/21 0215 -- -- -- -- -- 95 % -- --   12/03/21 0200 -- -- -- -- -- 97 % -- --   12/03/21 0145 107/64 -- -- -- 20 96 % -- --   12/03/21 0130 -- -- -- -- -- 92 % -- --   12/03/21 0115 -- -- -- -- -- 92 % -- --   12/03/21 0100 -- 100.4  F (38  C) Oral -- -- 95 % -- --   12/03/21 0045 -- -- -- -- -- 92 % -- --   12/03/21 0030 -- -- -- -- -- 95 % -- --   12/03/21 0015 116/66 -- -- -- -- 92 % -- --   12/03/21 0000 (!) 89/40 -- -- 78 -- 95 % -- --   12/02/21 2345 113/61 -- -- 77 -- 97 % -- --   12/02/21 2330 117/52 -- -- 78 -- 93 % -- --   12/02/21 2315 111/49 -- -- 74 -- 94 % -- --   12/02/21 2300 113/66 -- -- 78 -- 90 % -- --   12/02/21 2245 119/55 -- -- 78 -- 95 % -- --   12/02/21 2230 100/48 -- -- 76 -- 97 % -- --   12/02/21 2227 100/48 -- -- 76 20 96 % -- --   12/02/21 2150 -- -- -- -- -- 94 % -- --   12/02/21 2145 100/53 -- -- 84 -- 97 % -- --   12/02/21 2130 108/54 -- -- 86 20 96 % -- --   12/02/21 2113 (!) 89/59 -- -- 88 20 95 % " "-- --   12/02/21 2112 (!) 81/51 -- -- 82 20 95 % -- --   12/02/21 2110 (!) 82/41 -- -- 81 20 96 % -- --   12/02/21 2054 128/53 -- -- 92 20 93 % -- --   12/02/21 2001 -- -- -- -- 20 92 % -- --   12/02/21 1958 -- -- -- -- 20 92 % -- --   12/02/21 1951 -- -- -- -- -- 91 % -- --   12/02/21 1950 -- -- -- -- 18 (!) 88 % -- --   12/02/21 1947 -- -- -- -- 18 (!) 87 % -- --   12/02/21 1946 -- -- -- -- 18 90 % -- --   12/02/21 1934 -- -- -- -- -- 95 % -- --   12/02/21 1930 -- -- -- -- -- 91 % -- --   12/02/21 1929 -- -- -- -- -- 90 % -- --   12/02/21 1915 -- -- -- -- -- 91 % -- --   12/02/21 1900 -- -- -- -- -- 92 % -- --   12/02/21 1726 105/59 99  F (37.2  C) Oral 97 20 93 % 1.905 m (6' 3\") (!) 167.8 kg (370 lb)           ED RESULTS:   Results for orders placed or performed during the hospital encounter of 12/02/21 (from the past 24 hour(s))   Symptomatic Influenza A/B & SARS-CoV2 (COVID-19) Virus PCR Multiplex Nasopharyngeal     Status: Abnormal    Collection Time: 12/02/21  6:39 PM    Specimen: Nasopharyngeal; Swab   Result Value Ref Range    Influenza A PCR Negative Negative    Influenza B PCR Negative Negative    SARS CoV2 PCR Positive (A) Negative    Narrative    Testing was performed using the laura SARS-CoV-2 & Influenza A/B Assay on the laura Karen System. This test should be ordered for the detection of SARS-CoV-2 and influenza viruses in individuals who meet clinical and/or epidemiological criteria. Test performance is unknown in asymptomatic patients. This test is for in vitro diagnostic use under the FDA EUA for laboratories certified under CLIA to perform moderate and/or high complexity testing. This test has not been FDA cleared or approved. A negative result does not rule out the presence of PCR inhibitors in the specimen or target RNA in concentration below the limit of detection for the assay. If only one viral target is positive but coinfection with multiple targets is suspected, the sample should be " re-tested with another FDA cleared, approved or authorized test, if coinfection would change clinical management. [facility] are certified under the Clinical Laboratory Improvement Amendments of 1988 (CLIA-88) as  qualified to perform moderate and/or high complexity laboratory testing.   Lactic acid whole blood     Status: Normal    Collection Time: 12/02/21  9:03 PM   Result Value Ref Range    Lactic Acid 1.5 0.7 - 2.0 mmol/L   Blood gas venous     Status: Abnormal    Collection Time: 12/02/21  9:03 PM   Result Value Ref Range    pH Venous 7.44 (H) 7.32 - 7.43    pCO2 Venous 41 40 - 50 mm Hg    pO2 Venous 39 25 - 47 mm Hg    Bicarbonate Venous 28 21 - 28 mmol/L    Base Excess/Deficit (+/-) 3.3 (H) -7.7 - 1.9 mmol/L    FIO2 28    CBC with platelets differential     Status: Abnormal    Collection Time: 12/02/21  9:04 PM    Narrative    The following orders were created for panel order CBC with platelets differential.  Procedure                               Abnormality         Status                     ---------                               -----------         ------                     CBC with platelets and d...[[id number]]  Abnormal            Final result                 Please view results for these tests on the individual orders.   Comprehensive metabolic panel     Status: Abnormal    Collection Time: 12/02/21  9:04 PM   Result Value Ref Range    Sodium 135 133 - 144 mmol/L    Potassium 3.8 3.4 - 5.3 mmol/L    Chloride 99 94 - 109 mmol/L    Carbon Dioxide (CO2) 27 20 - 32 mmol/L    Anion Gap 9 3 - 14 mmol/L    Urea Nitrogen 24 7 - 30 mg/dL    Creatinine 1.39 (H) 0.52 - 1.04 mg/dL    Calcium 8.7 8.5 - 10.1 mg/dL    Glucose 112 (H) 70 - 99 mg/dL    Alkaline Phosphatase 55 40 - 150 U/L    AST 39 0 - 45 U/L    ALT 36 0 - 50 U/L    Protein Total 7.8 6.8 - 8.8 g/dL    Albumin 3.2 (L) 3.4 - 5.0 g/dL    Bilirubin Total 0.9 0.2 - 1.3 mg/dL    GFR Estimate 43 (L) >60 mL/min/1.73m2   Magnesium      Status: Abnormal    Collection Time: 12/02/21  9:04 PM   Result Value Ref Range    Magnesium 2.5 (H) 1.6 - 2.3 mg/dL   Troponin I     Status: Normal    Collection Time: 12/02/21  9:04 PM   Result Value Ref Range    Troponin I High Sensitivity 14 <54 ng/L   Procalcitonin     Status: Abnormal    Collection Time: 12/02/21  9:04 PM   Result Value Ref Range    Procalcitonin 0.09 (H) <0.05 ng/mL   CRP inflammation     Status: Abnormal    Collection Time: 12/02/21  9:04 PM   Result Value Ref Range    CRP Inflammation 90.6 (H) 0.0 - 8.0 mg/L   Ferritin     Status: Abnormal    Collection Time: 12/02/21  9:04 PM   Result Value Ref Range    Ferritin 1,036 (H) 8 - 252 ng/mL   Lactate Dehydrogenase     Status: Abnormal    Collection Time: 12/02/21  9:04 PM   Result Value Ref Range    Lactate Dehydrogenase 451 (H) 81 - 234 U/L   TSH with free T4 reflex     Status: Normal    Collection Time: 12/02/21  9:04 PM   Result Value Ref Range    TSH 1.13 0.40 - 4.00 mU/L   CBC with platelets and differential     Status: Abnormal    Collection Time: 12/02/21  9:04 PM   Result Value Ref Range    WBC Count 4.0 4.0 - 11.0 10e3/uL    RBC Count 5.21 (H) 3.80 - 5.20 10e6/uL    Hemoglobin 15.2 11.7 - 15.7 g/dL    Hematocrit 45.0 35.0 - 47.0 %    MCV 86 78 - 100 fL    MCH 29.2 26.5 - 33.0 pg    MCHC 33.8 31.5 - 36.5 g/dL    RDW 14.1 10.0 - 15.0 %    Platelet Count 112 (L) 150 - 450 10e3/uL    % Neutrophils 63 %    % Lymphocytes 29 %    % Monocytes 8 %    % Eosinophils 0 %    % Basophils 0 %    % Immature Granulocytes 0 %    NRBCs per 100 WBC 0 <1 /100    Absolute Neutrophils 2.5 1.6 - 8.3 10e3/uL    Absolute Lymphocytes 1.2 0.8 - 5.3 10e3/uL    Absolute Monocytes 0.3 0.0 - 1.3 10e3/uL    Absolute Eosinophils 0.0 0.0 - 0.7 10e3/uL    Absolute Basophils 0.0 0.0 - 0.2 10e3/uL    Absolute Immature Granulocytes 0.0 <=0.4 10e3/uL    Absolute NRBCs 0.0 10e3/uL   D dimer quantitative     Status: Abnormal    Collection Time: 12/02/21  9:05 PM   Result Value  Ref Range    D-Dimer Quantitative 0.89 (H) 0.00 - 0.50 ug/mL FEU    Narrative    This D-dimer assay is intended for use in conjunction with a clinical pretest probability assessment model to exclude pulmonary embolism (PE) and deep venous thrombosis (DVT) in outpatients suspected of PE or DVT. The cut-off value is 0.50 ug/mL FEU.   Fibrinogen activity     Status: Normal    Collection Time: 12/02/21  9:05 PM   Result Value Ref Range    Fibrinogen Activity 471 170 - 490 mg/dL   XR Chest Port 1 View     Status: None    Collection Time: 12/02/21  9:20 PM    Narrative    EXAM: XR CHEST PORT 1 VIEW  LOCATION: [facility]  DATE/TIME: 12/2/2021 9:08 PM    INDICATION: sob, covid positive  COMPARISON: None.      Impression    IMPRESSION: Heart size and pulmonary vascularity normal. Ill-defined nodular and hazy opacities both upper and lower lung fields concerning for pneumonia, including Covid 19. No effusions.   CT Chest Pulmonary Embolism w Contrast     Status: None    Collection Time: 12/02/21 10:13 PM    Narrative    EXAM: CT CHEST PULMONARY EMBOLISM W CONTRAST  LOCATION: [facility]  DATE/TIME: 12/2/2021 9:53 PM    INDICATION: PE suspected, low/intermediate prob, positive D-dimer  COMPARISON: None.  TECHNIQUE: CT chest pulmonary angiogram during arterial phase injection of IV contrast. Multiplanar reformats and MIP reconstructions were performed. Dose reduction techniques were used.   CONTRAST: 96 ml Isovue 370    FINDINGS:  ANGIOGRAM CHEST: No evidence for pulmonary embolism. Pulmonary arteries normal in caliber. Thoracic aorta normal in caliber. No aortic dissection or other acute abnormality.    HEART: Cardiac chambers within normal limits. No pericardial effusion. No coronary artery calcification.    MEDIASTINUM: No adenopathy or mass.    LUNGS AND PLEURA: Moderate scattered lobular and subsegmental groundglass densities throughout the lungs, becoming slightly  more confluent at the lung bases. Numerous scattered pulmonary cysts also randomly distributed throughout the lungs. No   pneumothorax. No pleural effusion.    LIMITED UPPER ABDOMEN: Hepatic steatosis. Mild hepatomegaly.    MUSCULOSKELETAL: Negative.      Impression    IMPRESSION:  1.  No evidence for pulmonary embolism.   2.  Moderate Covid 19 pneumonia.  3.  Numerous pulmonary cysts. 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 Consider follow-up pulmonary consultation.       ED MEDICATIONS:   Medications   albuterol (PROVENTIL HFA/VENTOLIN HFA) inhaler (6 puffs Inhalation Given 12/2/21 1931)   sodium chloride (PF) 0.9% PF flush 3 mL (has no administration in time range)   sodium chloride (PF) 0.9% PF flush 3 mL (3 mLs Intracatheter Given 12/3/21 0146)   atorvastatin (LIPITOR) tablet 20 mg (has no administration in time range)   levothyroxine (SYNTHROID/LEVOTHROID) tablet 100 mcg (has no administration in time range)   enoxaparin ANTICOAGULANT (LOVENOX) injection 40 mg (has no administration in time range)   dexamethasone PF (DECADRON) injection 6 mg (has no administration in time range)   droperidol (INAPSINE) injection 1.25 mg (has no administration in time range)   ondansetron (ZOFRAN-ODT) ODT tab 4 mg (4 mg Oral Given 12/2/21 1930)   acetaminophen (TYLENOL) tablet 1,000 mg (1,000 mg Oral Given 12/2/21 1934)   guaiFENesin (MUCINEX) 12 hr tablet 1,200 mg (1,200 mg Oral Given 12/2/21 1933)   aerochamber with mouthpiece (NO mask) - > 5 years 1 each (1 each Inhalation Given 12/2/21 1931)   dexamethasone PF (DECADRON) injection 6 mg (6 mg Intravenous Given 12/2/21 2109)   0.9% sodium chloride BOLUS (0 mLs Intravenous Stopped 12/2/21 2230)   iopamidol (ISOVUE-370) solution 96 mL (96 mLs Intravenous Given 12/2/21 2152)   sodium chloride 0.9 % bag 500mL for CT scan flush use (100 mLs As instructed Given 12/2/21 2152)   acetaminophen (TYLENOL) tablet 500 mg (500 mg Oral Given 12/3/21 0147)          Impression:    ICD-10-CM    1. Infection due to 2019 novel coronavirus  U07.1    2. Acute respiratory failure due to COVID-19 (H)  U07.1     J96.00    3. Hypoxia  R09.02        Plan:    Continue supportive cares and admit the patient to an appropriate hospital bed once 1 becomes available.    The patient was signed out to Dr. Chandler at change of shift while continuing to look for an appropriate inpatient hospital bed.      MD Enzo Harman Nicholas Hall, MD  12/03/21 0544

## 2021-12-03 NOTE — ED PROVIDER NOTES
56-year-old female with Covid pneumonia on 3 L of O2 by nasal cannula awaiting disposition, visiting from out of town.    During her time the emergency department during my shift she was stable on 3 L of O2 by nasal cannula.  She is received her ask Methasone and her levothyroxine.  At shift change her care was transitioned to Dr. Sorto awaiting placement.    Assessment: Lung hypoxic respiratory failure due to COVID-19 pneumonia.  Plan oxygen support and admission when a bed becomes available.     Jason Chandler MD  12/03/21 1233

## 2021-12-03 NOTE — ED PROVIDER NOTES
History     Chief Complaint   Patient presents with     Nausea & Vomiting     14 days/ cough/ weakness/ body aches     HPI    SUBJECTIVE: Steffanie Wan  is here today because of:covid like symptoms  The patient has had symptoms of cough, earache, sore throat, nasal congestion/runny nose, vomiting, headache, chest congestion, myalgias, decreased appetite, decreased activity, fatigue and chills, sweats.   Onset of symptoms was 11 days ago. Course of illness is same.  Patient denies exposure to illness at home.     Patient denies fever, diarrhea and mental confusion, left or right sided body weakness, speech difficulty  Treatment measures tried include water, ginger ale, sleep, advil.  Pt states is not COVID vaccinated  Patient is not exposed to tobacco  Pt denies THC, recreational drug use  Pt denies ETOH    Allergies:  Allergies   Allergen Reactions     Codeine      Msg [Monosodium Glutamate]      [place] Oil [Fish Oil]        Problem List:    Patient Active Problem List    Diagnosis Date Noted     Hypoxia 12/02/2021     Priority: Medium     Infection due to 2019 novel coronavirus 12/02/2021     Priority: Medium     Acute respiratory failure due to COVID-19 (H) 12/02/2021     Priority: Medium     Calcific Achilles tendinitis 05/13/2019     Priority: Medium        Past Medical History:    No past medical history on file.    Past Surgical History:    No past surgical history on file.    Family History:    No family history on file.    Social History:  Marital Status:  Single [1]  Social History     Tobacco Use     Smoking status: Not on file     Smokeless tobacco: Not on file   Substance Use Topics     Alcohol use: Not on file     Drug use: Not on file        Medications:    atorvastatin (LIPITOR) 20 MG tablet  levothyroxine (SYNTHROID/LEVOTHROID) 100 MCG tablet      Review of Systems  As mentioned above in the history present illness. All other systems were reviewed and are negative.    Physical Exam   BP:  "105/59  Pulse: 97  Temp: 99  F (37.2  C)  Resp: 20  Height: 190.5 cm (6' 3\")  Weight: (!) 167.8 kg (370 lb)  SpO2: 93 %      Physical Exam  Vitals and nursing note reviewed.   Constitutional:       General: She is not in acute distress.     Appearance: She is well-developed. She is obese. She is ill-appearing. She is not toxic-appearing or diaphoretic.   HENT:      Head: Normocephalic and atraumatic.      Right Ear: Tympanic membrane, ear canal and external ear normal.      Left Ear: Tympanic membrane, ear canal and external ear normal.      Nose: No rhinorrhea.      Mouth/Throat:      Mouth: Mucous membranes are moist.      Pharynx: No oropharyngeal exudate or posterior oropharyngeal erythema.   Eyes:      General:         Right eye: No discharge.         Left eye: No discharge.      Conjunctiva/sclera: Conjunctivae normal.   Cardiovascular:      Rate and Rhythm: Normal rate and regular rhythm.      Heart sounds: Normal heart sounds. No murmur heard.  No friction rub.   Pulmonary:      Effort: Pulmonary effort is normal. No respiratory distress.      Breath sounds: Normal breath sounds. No stridor. No wheezing or rales.   Chest:      Chest wall: No tenderness.   Abdominal:      General: Bowel sounds are normal.      Palpations: Abdomen is soft.      Tenderness: There is no abdominal tenderness. There is no guarding.   Skin:     General: Skin is warm and dry.      Capillary Refill: Capillary refill takes less than 2 seconds.      Coloration: Skin is not pale.      Findings: No erythema or rash.   Neurological:      Mental Status: She is alert.   Psychiatric:         Mood and Affect: Mood normal.         ED Course       ED Course as of 12/02/21 2212   Thu Dec 02, 2021   2021 Reviewed with patient positive Covid test, now hypoxic but stable on 2 L.  Patient reporting she is feeling slightly better after taking the Tylenol and the Zofran.  Requested admission with Dr. Del Valle who agrees to take patient on admission.  " However due to bed availability unable to send patient to floor.  Will initiate orders here.   2142 Reviewed D-dimer is elevated 0.89, will order chest CT PE protocol.  Covid labs are elevated including lactated dehydrogenase, CRP, and magnesium.  Ph is 7.44        Procedures    Results for orders placed or performed during the hospital encounter of 12/02/21 (from the past 24 hour(s))   Symptomatic Influenza A/B & SARS-CoV2 (COVID-19) Virus PCR Multiplex Nasopharyngeal    Specimen: Nasopharyngeal; Swab   Result Value Ref Range    Influenza A PCR Negative Negative    Influenza B PCR Negative Negative    SARS CoV2 PCR Positive (A) Negative    Narrative    Testing was performed using the laura SARS-CoV-2 & Influenza A/B Assay on the laura Karen System. This test should be ordered for the detection of SARS-CoV-2 and influenza viruses in individuals who meet clinical and/or epidemiological criteria. Test performance is unknown in asymptomatic patients. This test is for in vitro diagnostic use under the FDA EUA for laboratories certified under CLIA to perform moderate and/or high complexity testing. This test has not been FDA cleared or approved. A negative result does not rule out the presence of PCR inhibitors in the specimen or target RNA in concentration below the limit of detection for the assay. If only one viral target is positive but coinfection with multiple targets is suspected, the sample should be re-tested with another FDA cleared, approved or authorized test, if coinfection would change clinical management. [facility] are certified under the Clinical Laboratory Improvement Amendments of 1988 (CLIA-88) as  qualified to perform moderate and/or high complexity laboratory testing.   Lactic acid whole blood   Result Value Ref Range    Lactic Acid 1.5 0.7 - 2.0 mmol/L   Blood gas venous   Result Value Ref Range    pH Venous 7.44 (H) 7.32 - 7.43    pCO2 Venous 41 40 - 50 mm Hg    pO2 Venous 39 25  - 47 mm Hg    Bicarbonate Venous 28 21 - 28 mmol/L    Base Excess/Deficit (+/-) 3.3 (H) -7.7 - 1.9 mmol/L    FIO2 28    CBC with platelets differential    Narrative    The following orders were created for panel order CBC with platelets differential.  Procedure                               Abnormality         Status                     ---------                               -----------         ------                     CBC with platelets and d...[[id number]]  Abnormal            Final result                 Please view results for these tests on the individual orders.   Comprehensive metabolic panel   Result Value Ref Range    Sodium 135 133 - 144 mmol/L    Potassium 3.8 3.4 - 5.3 mmol/L    Chloride 99 94 - 109 mmol/L    Carbon Dioxide (CO2) 27 20 - 32 mmol/L    Anion Gap 9 3 - 14 mmol/L    Urea Nitrogen 24 7 - 30 mg/dL    Creatinine 1.39 (H) 0.52 - 1.04 mg/dL    Calcium 8.7 8.5 - 10.1 mg/dL    Glucose 112 (H) 70 - 99 mg/dL    Alkaline Phosphatase 55 40 - 150 U/L    AST 39 0 - 45 U/L    ALT 36 0 - 50 U/L    Protein Total 7.8 6.8 - 8.8 g/dL    Albumin 3.2 (L) 3.4 - 5.0 g/dL    Bilirubin Total 0.9 0.2 - 1.3 mg/dL    GFR Estimate 43 (L) >60 mL/min/1.73m2   Magnesium   Result Value Ref Range    Magnesium 2.5 (H) 1.6 - 2.3 mg/dL   Troponin I   Result Value Ref Range    Troponin I High Sensitivity 14 <54 ng/L   CRP inflammation   Result Value Ref Range    CRP Inflammation 90.6 (H) 0.0 - 8.0 mg/L   Lactate Dehydrogenase   Result Value Ref Range    Lactate Dehydrogenase 451 (H) 81 - 234 U/L   TSH with free T4 reflex   Result Value Ref Range    TSH 1.13 0.40 - 4.00 mU/L   CBC with platelets and differential   Result Value Ref Range    WBC Count 4.0 4.0 - 11.0 10e3/uL    RBC Count 5.21 (H) 3.80 - 5.20 10e6/uL    Hemoglobin 15.2 11.7 - 15.7 g/dL    Hematocrit 45.0 35.0 - 47.0 %    MCV 86 78 - 100 fL    MCH 29.2 26.5 - 33.0 pg    MCHC 33.8 31.5 - 36.5 g/dL    RDW 14.1 10.0 - 15.0 %    Platelet Count 112 (L) 150 - 450 10e3/uL     % Neutrophils 63 %    % Lymphocytes 29 %    % Monocytes 8 %    % Eosinophils 0 %    % Basophils 0 %    % Immature Granulocytes 0 %    NRBCs per 100 WBC 0 <1 /100    Absolute Neutrophils 2.5 1.6 - 8.3 10e3/uL    Absolute Lymphocytes 1.2 0.8 - 5.3 10e3/uL    Absolute Monocytes 0.3 0.0 - 1.3 10e3/uL    Absolute Eosinophils 0.0 0.0 - 0.7 10e3/uL    Absolute Basophils 0.0 0.0 - 0.2 10e3/uL    Absolute Immature Granulocytes 0.0 <=0.4 10e3/uL    Absolute NRBCs 0.0 10e3/uL   D dimer quantitative   Result Value Ref Range    D-Dimer Quantitative 0.89 (H) 0.00 - 0.50 ug/mL FEU    Narrative    This D-dimer assay is intended for use in conjunction with a clinical pretest probability assessment model to exclude pulmonary embolism (PE) and deep venous thrombosis (DVT) in outpatients suspected of PE or DVT. The cut-off value is 0.50 ug/mL FEU.   Fibrinogen activity   Result Value Ref Range    Fibrinogen Activity 471 170 - 490 mg/dL   XR Chest Port 1 View    Narrative    EXAM: XR CHEST PORT 1 VIEW  LOCATION: [facility]  DATE/TIME: 12/2/2021 9:08 PM    INDICATION: sob, covid positive  COMPARISON: None.      Impression    IMPRESSION: Heart size and pulmonary vascularity normal. Ill-defined nodular and hazy opacities both upper and lower lung fields concerning for pneumonia, including Covid 19. No effusions.       Medications   albuterol (PROVENTIL HFA/VENTOLIN HFA) inhaler (6 puffs Inhalation Given 12/2/21 1931)   sodium chloride (PF) 0.9% PF flush 3 mL (has no administration in time range)   sodium chloride (PF) 0.9% PF flush 3 mL (has no administration in time range)   0.9% sodium chloride BOLUS (500 mLs Intravenous New Bag 12/2/21 2125)   ondansetron (ZOFRAN-ODT) ODT tab 4 mg (4 mg Oral Given 12/2/21 1930)   acetaminophen (TYLENOL) tablet 1,000 mg (1,000 mg Oral Given 12/2/21 1934)   guaiFENesin (MUCINEX) 12 hr tablet 1,200 mg (1,200 mg Oral Given 12/2/21 1933)   aerochamber with mouthpiece (NO mask)  - > 5 years 1 each (1 each Inhalation Given 12/2/21 1931)   dexamethasone PF (DECADRON) injection 6 mg (6 mg Intravenous Given 12/2/21 2109)   iopamidol (ISOVUE-370) solution 96 mL (96 mLs Intravenous Given 12/2/21 2152)   sodium chloride 0.9 % bag 500mL for CT scan flush use (100 mLs As instructed Given 12/2/21 2152)       Assessments & Plan (with Medical Decision Making)     I have reviewed the nursing notes.    I have reviewed the findings, diagnosis, plan and need for follow up with the patient.  56-year-old female presenting to the emergency department with 11-day history of profuse body aches, fatigue, decreased appetite, persistent cough, intermittent nausea and vomiting,.  Patient is not Covid vaccinated patient has no known exposure.  On exam patient is initially stable with a blood pressure 108/54, temp 99, heart rate 86, respiratory rate is 20, O2 saturation 96%.  Suspicion for Covid illness.  Obtained Covid testing that was positive and within that timeframe patient became hypoxic.  Patient was initiated on 2 L of oxygen via nasal cannula and she tolerated this very well.  Patient reports feeling extremely fatigued.  Discussed with patient the importance of movement within the room.  Patient verbalized understanding about all of this.  Also discussed with patient home O2.  Currently patient is here visiting her mother who recently had surgery at [facility] for pacemaker and compression fracture.  Patient reports she has been taking care of her in her home.  At this present time there is very little space for isolation.  Initially called hospitalist who thought that there was a bed available here.  He accepted patient.  Subsequently it was found out that there is no bed available.  Patient will be boarding here.  Dr. Jamal Powell is collaborative physician in care of this patient.    --    ED Attending Physician Attestation    I Jamal Powell MD, cared for this patient with Zahraa  Gregor.    Summary of HPI, PE, ED Course   Patient is a 56 year old female evaluated in the emergency department for Covid. Exam notable for new O2 requirement. ED course notable for need for O2 and slightly concerning creatinine. After the completion of care in the emergency department, the patient was admitted to inpatient.    Critical Care & Procedures  Critical Care Addendum    My initial assessment, based on my review of nursing observations, review of vital signs, focused history, physical exam and discussion with Zahraa Bundy, established that Steffanie Wan has respiratory insufficiency, which requires immediate intervention, and therefore she is critically ill.     After the initial assessment, the care team initiated medication therapy with dexamethasone and O2 to provide stabilization care. Due to the critical nature of this patient, I reassessed vital signs, physical exam, mental status and respiratory status multiple times prior to her disposition.     Time also spent performing reviewing test results and coordination of care.     Critical care time (excluding teaching time and procedures): 8 minutes.     Medical Decision Making  The medical record was reviewed and interpreted.  Current labs reviewed and interpreted.  Current images reviewed and interpreted: CXR.      Jamal Powell MD  Emergency Medicine         New Prescriptions    No medications on file       Final diagnoses:   Infection due to 2019 novel coronavirus   Acute respiratory failure due to COVID-19 (H)   Hypoxia       12/2/2021   [facility] EMERGENCY DEPT     Zahraa Bundy APRN CNP  12/02/21 2210       Jamal Powell MD  12/03/21 9118

## 2021-12-04 LAB
ALBUMIN SERPL-MCNC: 2.8 G/DL (ref 3.4–5)
ALP SERPL-CCNC: 48 U/L (ref 40–150)
ALT SERPL W P-5'-P-CCNC: 30 U/L (ref 0–50)
ANION GAP SERPL CALCULATED.3IONS-SCNC: 8 MMOL/L (ref 3–14)
AST SERPL W P-5'-P-CCNC: 24 U/L (ref 0–45)
BILIRUB SERPL-MCNC: 0.6 MG/DL (ref 0.2–1.3)
BUN SERPL-MCNC: 25 MG/DL (ref 7–30)
CALCIUM SERPL-MCNC: 8.4 MG/DL (ref 8.5–10.1)
CHLORIDE BLD-SCNC: 105 MMOL/L (ref 94–109)
CO2 SERPL-SCNC: 26 MMOL/L (ref 20–32)
CREAT SERPL-MCNC: 0.9 MG/DL (ref 0.52–1.04)
CRP SERPL-MCNC: 51.5 MG/L (ref 0–8)
D DIMER PPP FEU-MCNC: 0.4 UG/ML FEU (ref 0–0.5)
ERYTHROCYTE [DISTWIDTH] IN BLOOD BY AUTOMATED COUNT: 14 % (ref 10–15)
FIBRINOGEN PPP-MCNC: 462 MG/DL (ref 170–490)
GFR SERPL CREATININE-BSD FRML MDRD: 73 ML/MIN/1.73M2
GLUCOSE BLD-MCNC: 176 MG/DL (ref 70–99)
HCT VFR BLD AUTO: 41.8 % (ref 35–47)
HGB BLD-MCNC: 13.8 G/DL (ref 11.7–15.7)
INR PPP: 1.12 (ref 0.85–1.15)
LDH SERPL L TO P-CCNC: 220 U/L (ref 81–234)
MAGNESIUM SERPL-MCNC: 2.6 MG/DL (ref 1.6–2.3)
MCH RBC QN AUTO: 28.9 PG (ref 26.5–33)
MCHC RBC AUTO-ENTMCNC: 33 G/DL (ref 31.5–36.5)
MCV RBC AUTO: 87 FL (ref 78–100)
PHOSPHATE SERPL-MCNC: 3.3 MG/DL (ref 2.5–4.5)
PLATELET # BLD AUTO: 116 10E3/UL (ref 150–450)
POTASSIUM BLD-SCNC: 4.6 MMOL/L (ref 3.4–5.3)
PROT SERPL-MCNC: 7.2 G/DL (ref 6.8–8.8)
RBC # BLD AUTO: 4.78 10E6/UL (ref 3.8–5.2)
RETICS # AUTO: 0.03 10E6/UL (ref 0.03–0.1)
RETICS/RBC NFR AUTO: 0.7 % (ref 0.5–2)
SODIUM SERPL-SCNC: 139 MMOL/L (ref 133–144)
WBC # BLD AUTO: 6.3 10E3/UL (ref 4–11)

## 2021-12-04 PROCEDURE — 250N000011 HC RX IP 250 OP 636: Performed by: EMERGENCY MEDICINE

## 2021-12-04 PROCEDURE — 85045 AUTOMATED RETICULOCYTE COUNT: CPT | Performed by: INTERNAL MEDICINE

## 2021-12-04 PROCEDURE — 250N000013 HC RX MED GY IP 250 OP 250 PS 637: Performed by: HOSPITALIST

## 2021-12-04 PROCEDURE — 99207 PR NOT IN PERSON INPATIENT CONSULT STATISTICAL MARKER: CPT | Performed by: HOSPITALIST

## 2021-12-04 PROCEDURE — 80053 COMPREHEN METABOLIC PANEL: CPT | Performed by: INTERNAL MEDICINE

## 2021-12-04 PROCEDURE — 85384 FIBRINOGEN ACTIVITY: CPT | Performed by: INTERNAL MEDICINE

## 2021-12-04 PROCEDURE — 84100 ASSAY OF PHOSPHORUS: CPT | Performed by: INTERNAL MEDICINE

## 2021-12-04 PROCEDURE — 120N000001 HC R&B MED SURG/OB

## 2021-12-04 PROCEDURE — XW033E5 INTRODUCTION OF REMDESIVIR ANTI-INFECTIVE INTO PERIPHERAL VEIN, PERCUTANEOUS APPROACH, NEW TECHNOLOGY GROUP 5: ICD-10-PCS | Performed by: HOSPITALIST

## 2021-12-04 PROCEDURE — 250N000011 HC RX IP 250 OP 636: Performed by: INTERNAL MEDICINE

## 2021-12-04 PROCEDURE — 86140 C-REACTIVE PROTEIN: CPT | Performed by: INTERNAL MEDICINE

## 2021-12-04 PROCEDURE — 85379 FIBRIN DEGRADATION QUANT: CPT | Performed by: INTERNAL MEDICINE

## 2021-12-04 PROCEDURE — 85027 COMPLETE CBC AUTOMATED: CPT | Performed by: INTERNAL MEDICINE

## 2021-12-04 PROCEDURE — 250N000012 HC RX MED GY IP 250 OP 636 PS 637: Performed by: NURSE PRACTITIONER

## 2021-12-04 PROCEDURE — 85610 PROTHROMBIN TIME: CPT | Performed by: INTERNAL MEDICINE

## 2021-12-04 PROCEDURE — 258N000003 HC RX IP 258 OP 636: Performed by: HOSPITALIST

## 2021-12-04 PROCEDURE — 36415 COLL VENOUS BLD VENIPUNCTURE: CPT | Performed by: INTERNAL MEDICINE

## 2021-12-04 PROCEDURE — 83615 LACTATE (LD) (LDH) ENZYME: CPT | Performed by: INTERNAL MEDICINE

## 2021-12-04 PROCEDURE — 250N000009 HC RX 250: Performed by: HOSPITALIST

## 2021-12-04 PROCEDURE — 250N000013 HC RX MED GY IP 250 OP 250 PS 637: Performed by: EMERGENCY MEDICINE

## 2021-12-04 PROCEDURE — 83735 ASSAY OF MAGNESIUM: CPT | Performed by: INTERNAL MEDICINE

## 2021-12-04 PROCEDURE — 99223 1ST HOSP IP/OBS HIGH 75: CPT | Mod: AI | Performed by: HOSPITALIST

## 2021-12-04 RX ORDER — LIDOCAINE 40 MG/G
CREAM TOPICAL
Status: DISCONTINUED | OUTPATIENT
Start: 2021-12-04 | End: 2021-12-06 | Stop reason: HOSPADM

## 2021-12-04 RX ORDER — ATORVASTATIN CALCIUM 20 MG/1
20 TABLET, FILM COATED ORAL AT BEDTIME
Status: DISCONTINUED | OUTPATIENT
Start: 2021-12-04 | End: 2021-12-06 | Stop reason: HOSPADM

## 2021-12-04 RX ORDER — LEVOTHYROXINE SODIUM 100 UG/1
100 TABLET ORAL DAILY
Status: DISCONTINUED | OUTPATIENT
Start: 2021-12-04 | End: 2021-12-04

## 2021-12-04 RX ORDER — DEXAMETHASONE SODIUM PHOSPHATE 10 MG/ML
6 INJECTION, SOLUTION INTRAMUSCULAR; INTRAVENOUS DAILY
Status: DISCONTINUED | OUTPATIENT
Start: 2021-12-04 | End: 2021-12-04

## 2021-12-04 RX ORDER — ALBUTEROL SULFATE 90 UG/1
2 AEROSOL, METERED RESPIRATORY (INHALATION) 4 TIMES DAILY
Status: DISCONTINUED | OUTPATIENT
Start: 2021-12-04 | End: 2021-12-06 | Stop reason: HOSPADM

## 2021-12-04 RX ADMIN — ENOXAPARIN SODIUM 80 MG: 100 INJECTION SUBCUTANEOUS at 17:56

## 2021-12-04 RX ADMIN — ALBUTEROL SULFATE 2 PUFF: 90 AEROSOL, METERED RESPIRATORY (INHALATION) at 21:00

## 2021-12-04 RX ADMIN — TIMOLOL MALEATE 1 DROP: 2.5 SOLUTION/ DROPS OPHTHALMIC at 07:45

## 2021-12-04 RX ADMIN — LEVOTHYROXINE SODIUM 100 MCG: 100 TABLET ORAL at 06:48

## 2021-12-04 RX ADMIN — DEXAMETHASONE 6 MG: 2 TABLET ORAL at 13:04

## 2021-12-04 RX ADMIN — ALBUTEROL SULFATE 2 PUFF: 90 AEROSOL, METERED RESPIRATORY (INHALATION) at 11:53

## 2021-12-04 RX ADMIN — ATORVASTATIN CALCIUM 20 MG: 20 TABLET, FILM COATED ORAL at 03:23

## 2021-12-04 RX ADMIN — SODIUM CHLORIDE 50 ML: 9 INJECTION, SOLUTION INTRAVENOUS at 20:07

## 2021-12-04 RX ADMIN — SODIUM CHLORIDE 50 ML: 9 INJECTION, SOLUTION INTRAVENOUS at 03:19

## 2021-12-04 RX ADMIN — ALBUTEROL SULFATE 2 PUFF: 90 AEROSOL, METERED RESPIRATORY (INHALATION) at 17:56

## 2021-12-04 RX ADMIN — TIMOLOL MALEATE 1 DROP: 2.5 SOLUTION/ DROPS OPHTHALMIC at 17:56

## 2021-12-04 RX ADMIN — ATORVASTATIN CALCIUM 20 MG: 20 TABLET, FILM COATED ORAL at 20:56

## 2021-12-04 RX ADMIN — REMDESIVIR 200 MG: 100 INJECTION, POWDER, LYOPHILIZED, FOR SOLUTION INTRAVENOUS at 03:16

## 2021-12-04 RX ADMIN — ALBUTEROL SULFATE 2 PUFF: 90 AEROSOL, METERED RESPIRATORY (INHALATION) at 07:44

## 2021-12-04 RX ADMIN — ENOXAPARIN SODIUM 40 MG: 100 INJECTION SUBCUTANEOUS at 07:43

## 2021-12-04 RX ADMIN — REMDESIVIR 100 MG: 100 INJECTION, POWDER, LYOPHILIZED, FOR SOLUTION INTRAVENOUS at 20:05

## 2021-12-04 ASSESSMENT — ACTIVITIES OF DAILY LIVING (ADL)
ADLS_ACUITY_SCORE: 4
ADLS_ACUITY_SCORE: 5
CONCENTRATING,_REMEMBERING_OR_MAKING_DECISIONS_DIFFICULTY: NO
DRESSING/BATHING_DIFFICULTY: NO
ADLS_ACUITY_SCORE: 4
DIFFICULTY_COMMUNICATING: NO
ADLS_ACUITY_SCORE: 4
DIFFICULTY_EATING/SWALLOWING: NO
TOILETING_ISSUES: NO
ADLS_ACUITY_SCORE: 4
WALKING_OR_CLIMBING_STAIRS_DIFFICULTY: NO
ADLS_ACUITY_SCORE: 4
WEAR_GLASSES_OR_BLIND: YES
ADLS_ACUITY_SCORE: 4
DOING_ERRANDS_INDEPENDENTLY_DIFFICULTY: NO
ADLS_ACUITY_SCORE: 4
FALL_HISTORY_WITHIN_LAST_SIX_MONTHS: NO
ADLS_ACUITY_SCORE: 4
VISION_MANAGEMENT: GLASSES

## 2021-12-04 ASSESSMENT — MIFFLIN-ST. JEOR: SCORE: 2450.63

## 2021-12-04 NOTE — PROGRESS NOTES
Patient alert and oriented. Telemetry monitoring set up for patient. First dose of remdesivir given tonight on medsurg floor. Continues with 3 LPM supplemental oxygen via NC.

## 2021-12-04 NOTE — ED NOTES
[facility]   ED Nurse to Floor Handoff     Steffanie Wan is a 56 year old female who speaks English and lives with family members,  in a home  They arrived in the ED by car from home    ED Chief Complaint: Nausea & Vomiting (14 days/ cough/ weakness/ body aches)    ED Dx;   Final diagnoses:   Infection due to 2019 novel coronavirus   Acute respiratory failure due to COVID-19 (H)   Hypoxia         Needed?: No    Allergies:   Allergies   Allergen Reactions     Codeine Headache and Nausea and Vomiting     Msg [Monosodium Glutamate] Headache and Nausea and Vomiting     [place] Oil [Fish Oil] Headache and Nausea and Vomiting   .  Past Medical Hx: No past medical history on file.   Baseline Mental status: WDL  Current Mental Status changes: at basesline    Infection present or suspected this encounter: no  Sepsis suspected: No  Isolation type: Special Precautions-COVID-19  Patient tested for COVID 19 prior to admission: YES     Activity level - Baseline/Home:  Independent  Activity Level - Current:   Independent    Bariatric equipment needed?: No    In the ED these meds were given:   Medications   albuterol (PROVENTIL HFA/VENTOLIN HFA) inhaler (6 puffs Inhalation Given 12/3/21 2040)   sodium chloride (PF) 0.9% PF flush 3 mL (has no administration in time range)   sodium chloride (PF) 0.9% PF flush 3 mL (3 mLs Intracatheter Given 12/3/21 2039)   acetaminophen (TYLENOL) tablet 1,000 mg (has no administration in time range)   guaiFENesin (MUCINEX) 12 hr tablet 1,200 mg (has no administration in time range)   ondansetron (ZOFRAN-ODT) ODT tab 4 mg (has no administration in time range)   dexamethasone (DECADRON) tablet 6 mg (6 mg Oral Given 12/3/21 2038)   atorvastatin (LIPITOR) tablet 20 mg (20 mg Oral Given 12/3/21 1901)   levothyroxine (SYNTHROID/LEVOTHROID) tablet 100 mcg (100 mcg Oral Given 12/3/21 0857)   enoxaparin ANTICOAGULANT (LOVENOX) injection 40 mg (40 mg Subcutaneous  Given 12/3/21 1902)   timolol maleate (TIMOPTIC) 0.25 % ophthalmic solution 1 drop (1 drop Both Eyes Given 12/3/21 2039)   ondansetron (ZOFRAN-ODT) ODT tab 4 mg (4 mg Oral Given 12/2/21 1930)   acetaminophen (TYLENOL) tablet 1,000 mg (1,000 mg Oral Given 12/2/21 1934)   guaiFENesin (MUCINEX) 12 hr tablet 1,200 mg (1,200 mg Oral Given 12/2/21 1933)   aerochamber with mouthpiece (NO mask) - > 5 years 1 each (1 each Inhalation Given 12/2/21 1931)   dexamethasone PF (DECADRON) injection 6 mg (6 mg Intravenous Given 12/2/21 2109)   aerochamber with mouthpiece (NO mask) - > 5 years 1 each (1 each Inhalation Given 12/3/21 2038)   0.9% sodium chloride BOLUS (0 mLs Intravenous Stopped 12/2/21 2230)   iopamidol (ISOVUE-370) solution 96 mL (96 mLs Intravenous Given 12/2/21 2152)   sodium chloride 0.9 % bag 500mL for CT scan flush use (100 mLs As instructed Given 12/2/21 2152)   acetaminophen (TYLENOL) tablet 500 mg (500 mg Oral Given 12/3/21 0147)   droperidol (INAPSINE) injection 1.25 mg (1.25 mg Intravenous Given 12/3/21 0549)       Drips running?  No    Home pump  No    Current LDAs  Peripheral IV 12/02/21 Anterior;Right Upper forearm (Active)   Site Assessment WDL 12/03/21 2048   Line Status Saline locked 12/03/21 2048   Phlebitis Scale 0-->no symptoms 12/03/21 2048   Infiltration Scale 0 12/03/21 2048   Number of days: 1       Labs results:   Labs Ordered and Resulted from Time of ED Arrival to Time of ED Departure   INFLUENZA A/B & SARS-COV2 PCR MULTIPLEX - Abnormal       Result Value    Influenza A PCR Negative      Influenza B PCR Negative      SARS CoV2 PCR Positive (*)    COMPREHENSIVE METABOLIC PANEL - Abnormal    Sodium 135      Potassium 3.8      Chloride 99      Carbon Dioxide (CO2) 27      Anion Gap 9      Urea Nitrogen 24      Creatinine 1.39 (*)     Calcium 8.7      Glucose 112 (*)     Alkaline Phosphatase 55      AST 39      ALT 36      Protein Total 7.8      Albumin 3.2 (*)     Bilirubin Total 0.9      GFR  "Estimate 43 (*)    MAGNESIUM - Abnormal    Magnesium 2.5 (*)    BLOOD GAS VENOUS - Abnormal    pH Venous 7.44 (*)     pCO2 Venous 41      pO2 Venous 39      Bicarbonate Venous 28      Base Excess/Deficit (+/-) 3.3 (*)     FIO2 28     PROCALCITONIN - Abnormal    Procalcitonin 0.09 (*)    CRP INFLAMMATION - Abnormal    CRP Inflammation 90.6 (*)    FERRITIN - Abnormal    Ferritin 1,036 (*)    LACTATE DEHYDROGENASE - Abnormal    Lactate Dehydrogenase 451 (*)    D DIMER QUANTITATIVE - Abnormal    D-Dimer Quantitative 0.89 (*)    CBC WITH PLATELETS AND DIFFERENTIAL - Abnormal    WBC Count 4.0      RBC Count 5.21 (*)     Hemoglobin 15.2      Hematocrit 45.0      MCV 86      MCH 29.2      MCHC 33.8      RDW 14.1      Platelet Count 112 (*)     % Neutrophils 63      % Lymphocytes 29      % Monocytes 8      % Eosinophils 0      % Basophils 0      % Immature Granulocytes 0      NRBCs per 100 WBC 0      Absolute Neutrophils 2.5      Absolute Lymphocytes 1.2      Absolute Monocytes 0.3      Absolute Eosinophils 0.0      Absolute Basophils 0.0      Absolute Immature Granulocytes 0.0      Absolute NRBCs 0.0     LACTIC ACID WHOLE BLOOD - Normal    Lactic Acid 1.5     TROPONIN I - Normal    Troponin I High Sensitivity 14     FIBRINOGEN ACTIVITY - Normal    Fibrinogen Activity 471     TSH WITH FREE T4 REFLEX - Normal    TSH 1.13         Imaging Studies: No results found for this or any previous visit (from the past 24 hour(s)).    Recent vital signs:   /62   Pulse 84   Temp 97.8  F (36.6  C) (Tympanic)   Resp 20   Ht 1.905 m (6' 3\")   Wt (!) 167.8 kg (370 lb)   SpO2 94%   BMI 46.25 kg/m      Mark Coma Scale Score: 15 (12/02/21 1726)       Cardiac Rhythm: Normal Sinus  Pt needs tele? No  Skin/wound Issues: None    Code Status: Full Code    Pain control: pt had none    Nausea control: pt had none    Abnormal labs/tests/findings requiring intervention:     Family present during ED course? No   Family Comments/Social " Situation comments:     Tasks needing completion: None    Rita Gatica, RN

## 2021-12-04 NOTE — PROGRESS NOTES
[facility] Internal Medicine Progress Note     Date of Service (when I saw the patient): 12/04/2021    REASON FOR ADMISSION / INTERVAL HISTORY:  Pt presented to ED 12/2 for SOB, cough, weakness, loss of appetite. She has not received her flu vaccine or her Covid vaccine.  She has not had coronavirus in the past and has not received any monoclonal antibodies.  During her time in the emergency department she became hypoxic and required 3 L of supplemental oxygen.  It appears her O2 sats dropped down to 87%.  She was holding in the emergency department secondary to bed availability.     ASSESSMENT/PLAN:     # Confirmed COVID-19 infection    # Acute Hypoxic Respiratory Failure secondary to COVID-19 infection  # Viral Pneumonia secondary to COVID-19 infection    Symptom Onset Monday 11/29   Date of 1st Positive Test 12/2   Vaccination Status Unvaccinated.        - Admit to medical floor with continuous pulse ox, COVID-19 special precautions  - Oxygen: continue current support with nasal cannula at 3 L/min; titrate to keep SpO2 between 90-96%  - Labs: CRP 90-53               D DIMER 0.89-0.40               -220               FERRITIN 1036  Daily covid labs ordered x 4 days  - Imaging: chest CT/ xray show B infiltrates consistent with covid infection  - Breathing treatments: no inhalers needed; avoid nebulizers in favor of MDIs   - IV fluids: not indicated at this time  - Antibiotics: not indicated   - COVID-Focused Medications: Dexamethasone 6 mg x 10 days or until hospital discharge, started on 12/2 and Remdesivir x 5 days or until hospital discharge, started on 12/4  - DVT Prophylaxis: at high risk of thrombotic complications due to COVID-19 (DDimer = 0.40 ug/mL FEU (Ref range: 0.00 - 0.50 ug/mL FEU) ).          - will increase lovenox to 0.5mg subcutaneous bid due to mormid obesity BMP 46        - consider anticoag on discharge for 30 days & until return to normal mobility    HLD  Continue meds    HYPO  "T4  Continue meds    DISPO  Will be in hospital 1-2 days atleast.          ARGELIA ACEVEDO MD   Pg [phone]    DVT Prhylaxis: Enoxaparin (Lovenox) SQ  Code Status: Full Code    ROS:  As described in A/P and Exam.  Otherwise ALL are  negative.    PHYSICAL EXAM:  All vitals have been reviewed    Blood pressure 111/51, pulse 71, temperature 98.9  F (37.2  C), temperature source Oral, resp. rate 18, height 1.905 m (6' 3\"), weight (!) 169.1 kg (372 lb 12.8 oz), SpO2 94 %.    No intake/output data recorded.    GENERAL APPEARANCE:morbidly obese, alert and no distress  EYES: conjunctiva clear, eyes grossly normal  HENT: external ears and nose normal   RESP: lungs-B crackles diffusely - no rales, rhonchi or wheezes  CV: regular rate and rhythm, normal S1 S2, no S3 or S4 and no murmur, click or rub   ABDOMEN: soft, nontender, no HSM or masses and bowel sounds normal  MS: no clubbing, cyanosis; no edema  SKIN: clear without significant rashes or lesions  NEURO: -non-focal moves all 4 extr    ROUTINE  LABS (Last four results)  CMP  Recent Labs   Lab 12/04/21 0433 12/02/21 2104    135   POTASSIUM 4.6 3.8   CHLORIDE 105 99   CO2 26 27   ANIONGAP 8 9   * 112*   BUN 25 24   CR 0.90 1.39*   GFRESTIMATED 73 43*   RAMONA 8.4* 8.7   MAG 2.6* 2.5*   PHOS 3.3  --    PROTTOTAL 7.2 7.8   ALBUMIN 2.8* 3.2*   BILITOTAL 0.6 0.9   ALKPHOS 48 55   AST 24 39   ALT 30 36     CBC  Recent Labs   Lab 12/04/21 0433 12/02/21 2104   WBC 6.3 4.0   RBC 4.78 5.21*   HGB 13.8 15.2   HCT 41.8 45.0   MCV 87 86   MCH 28.9 29.2   MCHC 33.0 33.8   RDW 14.0 14.1   * 112*     INR  Recent Labs   Lab 12/04/21 0433   INR 1.12     Arterial Blood Gas  Recent Labs   Lab 12/02/21 2103   O2PER 28       No results found for this or any previous visit (from the past 24 hour(s)).    "

## 2021-12-04 NOTE — H&P
[facility]    History and Physical - Hospitalist Service       Date of Admission:  12/2/2021    Assessment & Plan   #Acute hypoxic respiratory failure secondary to COVID-19 pneumonia  -Admit to medical telemetry  -Dexamethasone  -IV remdesivir  -Covid precautions  -Enoxaparin  -A.m. labs    #Past medical history of hyperlipidemia  -Home atorvastatin has been reinitiated    #Past medical history of hypothyroidism  -On levothyroxine has been initiated    FEN: Heart healthy, a.m. labs  Prophylaxis: Enoxaparin  CODE STATUS: Full         Diet: Combination Diet Regular Diet Adult    DVT Prophylaxis: Enoxaparin (Lovenox) SQ  Charles Catheter: Not present  Central Lines: None  Code Status: Full Code               Disposition Plan   Expected discharge: 12/06/2021   recommended to prior living arrangement once O2 use less than 4 liters/minute.     The patient's care was discussed with the Bedside Nurse and Patient.        Lashay Perkins MD  [facility]  Securely message with the Vocera Web Console (learn more here)  Text page via [facility] Paging/Directory      Visit/Communication Style   Virtual (Video) communication was used to evaluate Steffanie.  Steffanie consented to the use of video communication: yes  Video Duration: ~30mins  Patient's location: [facility]   Provider's location during the visit: [facility] Tele-medicine site        ______________________________________________________________________    Chief Complaint    Respiratory complaint since Monday      History of Present Illness   This is a 56-year-old female with a pertinent history of obesity hyperlipidemia hypothyroidism who presents to the emergency department with flulike symptoms since Monday.    Specifically patient states she is visiting her family here and lives in [place] and is a professor at the [employer] and has developed flulike symptoms since Monday.  She  states she has been having progressively worsening shortness of breath with some dyspnea on exertion, cough that is dry in nature, congestion, body aches, nausea and decreased overall appetite.  She has been working remotely from home and wearing her mask and trying to comply with all precautionary standards.  She has not received her flu vaccine or her Covid vaccine.  She has not had coronavirus in the past and has not received any monoclonal antibodies.  She does not smoke.    Patient presented to our emergency department on December 2 and on presentation her blood pressure was 108/54, temperature 99  F, heart rate 86, respiratory 20 with an O2 sat of 96%.  Her Covid was positive.  During her time in the emergency department she became hypoxic and required 3 L of supplemental oxygen.  It appears her O2 sats dropped down to 87%.  She was holding in the emergency department secondary to bed availability.  IV dexamethasone and enoxaparin therapeutically was started.      Review of Systems    General: see HPI  Eyes: negative for blurred vision, loss of vision  Ear Nose and Throat: negative for pharyngitis, speech or swallowing difficulties  Respiratory:  See HPI  Cardiology:  negative for chest pain, palpitations, orthopnea, PND, edema, syncope   Gastrointestinal: negative for abdominal pain, nausea, vomiting, diarrhea, constipation, hematemesis, melena or hematochezia  Genitourinary: negative for frequency, urgency, dysuria, hematuria   Neurological: negative for focal weakness, paresthesia    Past Medical History    Hyperlipidemia, Hypothyroidism, Obesity    Past Surgical History   No past surgical history on file.    Social History   I have reviewed this patient's social history and updated it with pertinent information if needed.  Social History     Tobacco Use     Smoking status: Not on file     Smokeless tobacco: Not on file   Substance Use Topics     Alcohol use: Not on file     Drug use: Not on file          Prior to Admission Medications   Prior to Admission Medications   Prescriptions Last Dose Informant Patient Reported? Taking?   atorvastatin (LIPITOR) 20 MG tablet 12/1/2021 at hs Self Yes Yes   Sig: Take 20 mg by mouth At Bedtime   levothyroxine (SYNTHROID/LEVOTHROID) 100 MCG tablet Past Week at am Self Yes Yes   Sig: Take 100 mcg by mouth daily   timolol maleate (TIMOPTIC) 0.25 % ophthalmic solution 12/3/2021 at hs Self Yes Yes   Sig: Place 1 drop into both eyes 2 times daily      Facility-Administered Medications: None     Allergies   Allergies   Allergen Reactions     Codeine Headache and Nausea and Vomiting     Msg [Monosodium Glutamate] Headache and Nausea and Vomiting     Clovis Oil [Fish Oil] Headache and Nausea and Vomiting       Physical Exam   Vital Signs: Temp: 98.4  F (36.9  C) Temp src: Oral BP: 106/50 Pulse: 94   Resp: 20 SpO2: 94 % O2 Device: Nasal cannula Oxygen Delivery: 3 LPM  Weight: 372 lbs 12.77 oz    Gen:  Well-developed, well-nourished, in no acute distress, lying semi-supine in hospital stretcher  HEENT:  Anicteric sclera, PER, hearing intact to voice  Resp:  No accessory muscle use, breath sounds clear; no wheezes no rales no rhonchi  Card:  No murmur, normal S1, S2   Abd:  Soft per RN exam, no TTP, non-distended, normoactive bowel sounds are present  Musc:  Normal strength and movement of the major muscle groups without obvious deformity  Psych:  Good insight, oriented to person, place and time, not anxious, not agitated    Data     Recent Labs   Lab 12/02/21  2104   WBC 4.0   HGB 15.2   MCV 86   *      POTASSIUM 3.8   CHLORIDE 99   CO2 27   BUN 24   CR 1.39*   ANIONGAP 9   RAMONA 8.7   *   ALBUMIN 3.2*   PROTTOTAL 7.8   BILITOTAL 0.9   ALKPHOS 55   ALT 36   AST 39         No results found for this or any previous visit (from the past 24 hour(s)).

## 2021-12-04 NOTE — PLAN OF CARE
"Patient is up independently in her room, is alert and oriented, no complaints of pain. She is on 3.5L via NC with sats around 94%, she does drop to 89% but recovers with deep breathing and coughing. Lung sounds are clear. All other VVS.     /64 (BP Location: Left arm)   Pulse 88   Temp 98  F (36.7  C) (Oral)   Resp 16   Ht 1.905 m (6' 3\")   Wt (!) 169.1 kg (372 lb 12.8 oz)   SpO2 94%   BMI 46.60 kg/m      "

## 2021-12-04 NOTE — PLAN OF CARE
"[place] [facility] ADMISSION NOTE    Patient admitted to room 2308 at approximately 0000 via cart from emergency room. Patient was accompanied by transport tech.     Verbal SBAR report received from Марина prior to patient arrival.     Patient ambulated to bed with stand-by assist. Patient alert and oriented X 3. The patient is not having any pain.  . Admission vital signs: Blood pressure 106/50, pulse 94, temperature 97.8  F (36.6  C), temperature source Tympanic, resp. rate 20, height 1.905 m (6' 3\"), weight (!) 169.1 kg (372 lb 12.8 oz), SpO2 94 %. Patient was oriented to plan of care, call light, bed controls, tv, telephone, bathroom and visiting hours.     Risk Assessment    The following safety risks were identified during admission: none. Yellow risk band applied: NO.     Skin Initial Assessment    This writer admitted this patient and declined a full skin assessment and Jorge A score in the Adult PCS flowsheet. Appropriate interventions initiated as needed.     Secondary skin check declined.     Jorge A Risk Assessment  Sensory Perception: 4-->no impairment  Moisture: 4-->rarely moist  Activity: 3-->walks occasionally  Mobility: 4-->no limitation  Nutrition: 3-->adequate  Friction and Shear: 3-->no apparent problem  Jorge A Score: 21    Education    Patient has a [place] to Observation order: No  Observation education completed and documented: N/A      ENZO CAMPOVERDE RN    "

## 2021-12-05 LAB
ALBUMIN SERPL-MCNC: 2.5 G/DL (ref 3.4–5)
ALP SERPL-CCNC: 43 U/L (ref 40–150)
ALT SERPL W P-5'-P-CCNC: 34 U/L (ref 0–50)
ANION GAP SERPL CALCULATED.3IONS-SCNC: 5 MMOL/L (ref 3–14)
AST SERPL W P-5'-P-CCNC: 24 U/L (ref 0–45)
BILIRUB SERPL-MCNC: 0.3 MG/DL (ref 0.2–1.3)
BUN SERPL-MCNC: 25 MG/DL (ref 7–30)
CALCIUM SERPL-MCNC: 8.3 MG/DL (ref 8.5–10.1)
CHLORIDE BLD-SCNC: 109 MMOL/L (ref 94–109)
CO2 SERPL-SCNC: 27 MMOL/L (ref 20–32)
CREAT SERPL-MCNC: 0.79 MG/DL (ref 0.52–1.04)
CRP SERPL-MCNC: 23.2 MG/L (ref 0–8)
D DIMER PPP FEU-MCNC: 0.37 UG/ML FEU (ref 0–0.5)
ERYTHROCYTE [DISTWIDTH] IN BLOOD BY AUTOMATED COUNT: 14.2 % (ref 10–15)
FIBRINOGEN PPP-MCNC: 390 MG/DL (ref 170–490)
GFR SERPL CREATININE-BSD FRML MDRD: 85 ML/MIN/1.73M2
GLUCOSE BLD-MCNC: 130 MG/DL (ref 70–99)
HCT VFR BLD AUTO: 39.9 % (ref 35–47)
HGB BLD-MCNC: 12.9 G/DL (ref 11.7–15.7)
INR PPP: 1.28 (ref 0.85–1.15)
LDH SERPL L TO P-CCNC: 201 U/L (ref 81–234)
MCH RBC QN AUTO: 28.7 PG (ref 26.5–33)
MCHC RBC AUTO-ENTMCNC: 32.3 G/DL (ref 31.5–36.5)
MCV RBC AUTO: 89 FL (ref 78–100)
PLATELET # BLD AUTO: 119 10E3/UL (ref 150–450)
POTASSIUM BLD-SCNC: 4.5 MMOL/L (ref 3.4–5.3)
PROT SERPL-MCNC: 6.3 G/DL (ref 6.8–8.8)
RBC # BLD AUTO: 4.5 10E6/UL (ref 3.8–5.2)
RETICS # AUTO: 0.03 10E6/UL (ref 0.03–0.1)
RETICS/RBC NFR AUTO: 0.8 % (ref 0.5–2)
SODIUM SERPL-SCNC: 141 MMOL/L (ref 133–144)
TROPONIN I SERPL HS-MCNC: 10 NG/L
WBC # BLD AUTO: 5.4 10E3/UL (ref 4–11)

## 2021-12-05 PROCEDURE — 84484 ASSAY OF TROPONIN QUANT: CPT | Performed by: HOSPITALIST

## 2021-12-05 PROCEDURE — 85379 FIBRIN DEGRADATION QUANT: CPT | Performed by: INTERNAL MEDICINE

## 2021-12-05 PROCEDURE — 85027 COMPLETE CBC AUTOMATED: CPT | Performed by: INTERNAL MEDICINE

## 2021-12-05 PROCEDURE — 85384 FIBRINOGEN ACTIVITY: CPT | Performed by: INTERNAL MEDICINE

## 2021-12-05 PROCEDURE — 250N000013 HC RX MED GY IP 250 OP 250 PS 637: Performed by: EMERGENCY MEDICINE

## 2021-12-05 PROCEDURE — 99232 SBSQ HOSP IP/OBS MODERATE 35: CPT | Performed by: INTERNAL MEDICINE

## 2021-12-05 PROCEDURE — 86140 C-REACTIVE PROTEIN: CPT | Performed by: INTERNAL MEDICINE

## 2021-12-05 PROCEDURE — 83615 LACTATE (LD) (LDH) ENZYME: CPT | Performed by: INTERNAL MEDICINE

## 2021-12-05 PROCEDURE — 250N000012 HC RX MED GY IP 250 OP 636 PS 637: Performed by: NURSE PRACTITIONER

## 2021-12-05 PROCEDURE — 80053 COMPREHEN METABOLIC PANEL: CPT | Performed by: INTERNAL MEDICINE

## 2021-12-05 PROCEDURE — 250N000011 HC RX IP 250 OP 636: Performed by: INTERNAL MEDICINE

## 2021-12-05 PROCEDURE — 85610 PROTHROMBIN TIME: CPT | Performed by: INTERNAL MEDICINE

## 2021-12-05 PROCEDURE — 258N000003 HC RX IP 258 OP 636: Performed by: HOSPITALIST

## 2021-12-05 PROCEDURE — 250N000009 HC RX 250: Performed by: HOSPITALIST

## 2021-12-05 PROCEDURE — 85045 AUTOMATED RETICULOCYTE COUNT: CPT | Performed by: INTERNAL MEDICINE

## 2021-12-05 PROCEDURE — 120N000001 HC R&B MED SURG/OB

## 2021-12-05 PROCEDURE — 250N000013 HC RX MED GY IP 250 OP 250 PS 637: Performed by: HOSPITALIST

## 2021-12-05 PROCEDURE — 36415 COLL VENOUS BLD VENIPUNCTURE: CPT | Performed by: INTERNAL MEDICINE

## 2021-12-05 RX ADMIN — SODIUM CHLORIDE 50 ML: 9 INJECTION, SOLUTION INTRAVENOUS at 20:12

## 2021-12-05 RX ADMIN — ENOXAPARIN SODIUM 80 MG: 100 INJECTION SUBCUTANEOUS at 17:25

## 2021-12-05 RX ADMIN — ALBUTEROL SULFATE 2 PUFF: 90 AEROSOL, METERED RESPIRATORY (INHALATION) at 21:08

## 2021-12-05 RX ADMIN — DEXAMETHASONE 6 MG: 2 TABLET ORAL at 13:33

## 2021-12-05 RX ADMIN — ENOXAPARIN SODIUM 80 MG: 100 INJECTION SUBCUTANEOUS at 08:46

## 2021-12-05 RX ADMIN — ALBUTEROL SULFATE 2 PUFF: 90 AEROSOL, METERED RESPIRATORY (INHALATION) at 17:25

## 2021-12-05 RX ADMIN — TIMOLOL MALEATE 1 DROP: 2.5 SOLUTION/ DROPS OPHTHALMIC at 08:46

## 2021-12-05 RX ADMIN — TIMOLOL MALEATE 1 DROP: 2.5 SOLUTION/ DROPS OPHTHALMIC at 17:24

## 2021-12-05 RX ADMIN — ALBUTEROL SULFATE 2 PUFF: 90 AEROSOL, METERED RESPIRATORY (INHALATION) at 08:46

## 2021-12-05 RX ADMIN — REMDESIVIR 100 MG: 100 INJECTION, POWDER, LYOPHILIZED, FOR SOLUTION INTRAVENOUS at 20:09

## 2021-12-05 RX ADMIN — ALBUTEROL SULFATE 2 PUFF: 90 AEROSOL, METERED RESPIRATORY (INHALATION) at 12:45

## 2021-12-05 RX ADMIN — LEVOTHYROXINE SODIUM 100 MCG: 100 TABLET ORAL at 06:36

## 2021-12-05 RX ADMIN — ATORVASTATIN CALCIUM 20 MG: 20 TABLET, FILM COATED ORAL at 21:09

## 2021-12-05 ASSESSMENT — ACTIVITIES OF DAILY LIVING (ADL)
ADLS_ACUITY_SCORE: 4

## 2021-12-05 NOTE — PROGRESS NOTES
Supplemental oxygen increased to 4 L via NC while sleeping. During the night while sleeping, patient frequently desats to 87-88%. When she wakes up, her sats jump back up to 94-95%.  Patient continues with a productive cough.

## 2021-12-05 NOTE — PROGRESS NOTES
[facility] Internal Medicine Progress Note     Date of Service (when I saw the patient): 12/05/2021    REASON FOR ADMISSION / INTERVAL HISTORY:  Pt presented to ED 12/2 for SOB, cough, weakness, loss of appetite. She has not received her flu vaccine or her Covid vaccine.  She has not had coronavirus in the past and has not received any monoclonal antibodies.  During her time in the emergency department she became hypoxic and required 3 L of supplemental oxygen.  It appears her O2 sats dropped down to 87%.  She was holding in the emergency department secondary to bed availability.   She continues to cough a lot and now has some sputum production.    ASSESSMENT/PLAN:     # Confirmed COVID-19 infection    # Acute Hypoxic Respiratory Failure secondary to COVID-19 infection  # Viral Pneumonia secondary to COVID-19 infection    Symptom Onset Monday 11/29   Date of 1st Positive Test 12/2   Vaccination Status Unvaccinated.        - Admit to medical floor with continuous pulse ox, COVID-19 special precautions  - Oxygen: still needing 3-4 L/min; titrate to keep SpO2 between 90-96%  - Labs: CRP 90-53-23               D DIMER 0.89-0.40-0.3               -220               FERRITIN 1036  Daily covid labs ordered x 4 days  - Imaging: chest CT/ xray show B infiltrates consistent with covid infection  - Breathing treatments: no inhalers needed; avoid nebulizers in favor of MDIs   - IV fluids: not indicated at this time  - Antibiotics: not indicated   - COVID-Focused Medications: Dexamethasone 6 mg x 10 days or until hospital discharge, started on 12/2 and Remdesivir x 5 days or until hospital discharge, started on 12/4  - DVT Prophylaxis: at high risk of thrombotic complications due to COVID-19 (DDimer = 0.40 ug/mL FEU (Ref range: 0.00 - 0.50 ug/mL FEU) ).          - will increase lovenox to 0.5mg subcutaneous bid due to mormid obesity BMP 46        - consider anticoag on discharge for 30 days & until return to normal  "mobility  Continues to cough and now having sputum production. WBC still nl. PCT was 0.09. will continue current rx.     HLD  Continue meds    HYPO T4  Continue meds    DISPO  Will be in hospital 1-2 days atleast.          ARGELIA ACEVEDO MD   Pg [phone]    DVT Prhylaxis: Enoxaparin (Lovenox) SQ  Code Status: Full Code    ROS:  As described in A/P and Exam.  Otherwise ALL are  negative.    PHYSICAL EXAM:  All vitals have been reviewed    Blood pressure 110/57, pulse 76, temperature 98  F (36.7  C), temperature source Oral, resp. rate 20, height 1.905 m (6' 3\"), weight (!) 169.1 kg (372 lb 12.8 oz), SpO2 97 %.    No intake/output data recorded.    GENERAL APPEARANCE:morbidly obese, alert and no distress  EYES: conjunctiva clear, eyes grossly normal  HENT: external ears and nose normal   RESP: lungs-B crackles diffusely - no rales, rhonchi or wheezes  CV: regular rate and rhythm, normal S1 S2, no S3 or S4 and no murmur, click or rub   ABDOMEN: soft, nontender, no HSM or masses and bowel sounds normal  MS: no clubbing, cyanosis; no edema  SKIN: clear without significant rashes or lesions  NEURO: -non-focal moves all 4 extr    ROUTINE  LABS (Last four results)  CMP  Recent Labs   Lab 12/05/21 0537 12/04/21  0433 12/02/21  2104    139 135   POTASSIUM 4.5 4.6 3.8   CHLORIDE 109 105 99   CO2 27 26 27   ANIONGAP 5 8 9   * 176* 112*   BUN 25 25 24   CR 0.79 0.90 1.39*   GFRESTIMATED 85 73 43*   RAMONA 8.3* 8.4* 8.7   MAG  --  2.6* 2.5*   PHOS  --  3.3  --    PROTTOTAL 6.3* 7.2 7.8   ALBUMIN 2.5* 2.8* 3.2*   BILITOTAL 0.3 0.6 0.9   ALKPHOS 43 48 55   AST 24 24 39   ALT 34 30 36     CBC  Recent Labs   Lab 12/05/21 0537 12/04/21  0433 12/02/21  2104   WBC 5.4 6.3 4.0   RBC 4.50 4.78 5.21*   HGB 12.9 13.8 15.2   HCT 39.9 41.8 45.0   MCV 89 87 86   MCH 28.7 28.9 29.2   MCHC 32.3 33.0 33.8   RDW 14.2 14.0 14.1   * 116* 112*     INR  Recent Labs   Lab 12/05/21  0537 12/04/21  0433   INR 1.28* 1.12     Arterial " Blood Gas  Recent Labs   Lab 12/02/21  2103   O2PER 28       No results found for this or any previous visit (from the past 24 hour(s)).

## 2021-12-06 VITALS
TEMPERATURE: 97.7 F | SYSTOLIC BLOOD PRESSURE: 113 MMHG | HEART RATE: 60 BPM | OXYGEN SATURATION: 93 % | DIASTOLIC BLOOD PRESSURE: 53 MMHG | HEIGHT: 72 IN | RESPIRATION RATE: 22 BRPM | BODY MASS INDEX: 39.68 KG/M2 | WEIGHT: 293 LBS

## 2021-12-06 LAB
ALBUMIN SERPL-MCNC: 2.5 G/DL (ref 3.4–5)
ALP SERPL-CCNC: 43 U/L (ref 40–150)
ALT SERPL W P-5'-P-CCNC: 38 U/L (ref 0–50)
ANION GAP SERPL CALCULATED.3IONS-SCNC: 4 MMOL/L (ref 3–14)
AST SERPL W P-5'-P-CCNC: 22 U/L (ref 0–45)
BILIRUB SERPL-MCNC: 0.4 MG/DL (ref 0.2–1.3)
BUN SERPL-MCNC: 20 MG/DL (ref 7–30)
CALCIUM SERPL-MCNC: 8.3 MG/DL (ref 8.5–10.1)
CHLORIDE BLD-SCNC: 112 MMOL/L (ref 94–109)
CO2 SERPL-SCNC: 27 MMOL/L (ref 20–32)
CREAT SERPL-MCNC: 0.72 MG/DL (ref 0.52–1.04)
CRP SERPL-MCNC: 22.7 MG/L (ref 0–8)
D DIMER PPP FEU-MCNC: 0.39 UG/ML FEU (ref 0–0.5)
ERYTHROCYTE [DISTWIDTH] IN BLOOD BY AUTOMATED COUNT: 14.6 % (ref 10–15)
FIBRINOGEN PPP-MCNC: 407 MG/DL (ref 170–490)
GFR SERPL CREATININE-BSD FRML MDRD: >90 ML/MIN/1.73M2
GLUCOSE BLD-MCNC: 139 MG/DL (ref 70–99)
HCT VFR BLD AUTO: 39.1 % (ref 35–47)
HGB BLD-MCNC: 12.7 G/DL (ref 11.7–15.7)
INR PPP: 1.23 (ref 0.85–1.15)
LDH SERPL L TO P-CCNC: 192 U/L (ref 81–234)
MCH RBC QN AUTO: 29.4 PG (ref 26.5–33)
MCHC RBC AUTO-ENTMCNC: 32.5 G/DL (ref 31.5–36.5)
MCV RBC AUTO: 91 FL (ref 78–100)
PLATELET # BLD AUTO: 137 10E3/UL (ref 150–450)
POTASSIUM BLD-SCNC: 4.5 MMOL/L (ref 3.4–5.3)
PROT SERPL-MCNC: 6.5 G/DL (ref 6.8–8.8)
RBC # BLD AUTO: 4.32 10E6/UL (ref 3.8–5.2)
RETICS # AUTO: 0.04 10E6/UL (ref 0.03–0.1)
RETICS/RBC NFR AUTO: 0.8 % (ref 0.5–2)
SODIUM SERPL-SCNC: 143 MMOL/L (ref 133–144)
WBC # BLD AUTO: 6.6 10E3/UL (ref 4–11)

## 2021-12-06 PROCEDURE — 250N000012 HC RX MED GY IP 250 OP 636 PS 637: Performed by: NURSE PRACTITIONER

## 2021-12-06 PROCEDURE — 85610 PROTHROMBIN TIME: CPT | Performed by: INTERNAL MEDICINE

## 2021-12-06 PROCEDURE — 85027 COMPLETE CBC AUTOMATED: CPT | Performed by: INTERNAL MEDICINE

## 2021-12-06 PROCEDURE — 86140 C-REACTIVE PROTEIN: CPT | Performed by: INTERNAL MEDICINE

## 2021-12-06 PROCEDURE — 85045 AUTOMATED RETICULOCYTE COUNT: CPT | Performed by: INTERNAL MEDICINE

## 2021-12-06 PROCEDURE — 83520 IMMUNOASSAY QUANT NOS NONAB: CPT | Performed by: HOSPITALIST

## 2021-12-06 PROCEDURE — 85379 FIBRIN DEGRADATION QUANT: CPT | Performed by: INTERNAL MEDICINE

## 2021-12-06 PROCEDURE — 85384 FIBRINOGEN ACTIVITY: CPT | Performed by: INTERNAL MEDICINE

## 2021-12-06 PROCEDURE — 250N000011 HC RX IP 250 OP 636: Performed by: INTERNAL MEDICINE

## 2021-12-06 PROCEDURE — 36415 COLL VENOUS BLD VENIPUNCTURE: CPT | Performed by: HOSPITALIST

## 2021-12-06 PROCEDURE — 83615 LACTATE (LD) (LDH) ENZYME: CPT | Performed by: INTERNAL MEDICINE

## 2021-12-06 PROCEDURE — 250N000013 HC RX MED GY IP 250 OP 250 PS 637: Performed by: EMERGENCY MEDICINE

## 2021-12-06 PROCEDURE — 82040 ASSAY OF SERUM ALBUMIN: CPT | Performed by: INTERNAL MEDICINE

## 2021-12-06 PROCEDURE — 99239 HOSP IP/OBS DSCHRG MGMT >30: CPT | Performed by: HOSPITALIST

## 2021-12-06 PROCEDURE — 36415 COLL VENOUS BLD VENIPUNCTURE: CPT | Performed by: INTERNAL MEDICINE

## 2021-12-06 RX ADMIN — LEVOTHYROXINE SODIUM 100 MCG: 100 TABLET ORAL at 07:04

## 2021-12-06 RX ADMIN — ENOXAPARIN SODIUM 80 MG: 100 INJECTION SUBCUTANEOUS at 08:38

## 2021-12-06 RX ADMIN — ALBUTEROL SULFATE 2 PUFF: 90 AEROSOL, METERED RESPIRATORY (INHALATION) at 08:38

## 2021-12-06 RX ADMIN — DEXAMETHASONE 6 MG: 2 TABLET ORAL at 12:01

## 2021-12-06 RX ADMIN — TIMOLOL MALEATE 1 DROP: 2.5 SOLUTION/ DROPS OPHTHALMIC at 08:37

## 2021-12-06 ASSESSMENT — ACTIVITIES OF DAILY LIVING (ADL)
ADLS_ACUITY_SCORE: 4

## 2021-12-06 NOTE — PLAN OF CARE
[facility] DISCHARGE NOTE    Patient discharged to home at 12:26 PM via wheel chair. Accompanied by staff. Discharge instructions reviewed with patient, opportunity offered to ask questions. Prescriptions filled and sent with patient upon discharge. All belongings sent with patient.    Heather Conway RN

## 2021-12-06 NOTE — PROGRESS NOTES
Pt has been assessed for Home Oxygen needs & does not qualify for home O2 @ this time.    * RA at rest Pulse oximetry SpO2 93 %  *RA during activity/exercise SpO2 92 %

## 2021-12-06 NOTE — PLAN OF CARE
"Patient alert/oriented. Independent. Productive, loose cough. CHI. LS clear.  Patient stable on RA per RT. Patient sleeping and sats dipped to 86%.  Placed on 1L NC for comfort so patient can sleep. VSS.  /53 (BP Location: Left arm)   Pulse 60   Temp 97.7  F (36.5  C) (Oral)   Resp 22   Ht 1.905 m (6' 3\")   Wt (!) 169.1 kg (372 lb 12.8 oz)   SpO2 93%   BMI 46.60 kg/m      Plan: discharge home (no home oxygen needed per RT)  Heather Conway RN on 12/6/2021 at 10:28 AM    "

## 2021-12-06 NOTE — DISCHARGE INSTRUCTIONS
Eliquis (Apixaban) Oral tablet  What is this medicine?  APIXABAN (a PIX a ban) is an anticoagulant (blood thinner). It is used to lower the chance of stroke in people with a medical condition called atrial fibrillation. It is also used to treat or prevent blood clots in the lungs or in the veins.  This medicine may be used for other purposes; ask your health care provider or pharmacist if you have questions.  What should I tell my health care provider before I take this medicine?  They need to know if you have any of these conditions:    bleeding disorders    bleeding in the brain    blood in your stools (black or tarry stools) or if you have blood in your vomit    history of stomach bleeding    kidney disease    liver disease    mechanical heart valve    an unusual or allergic reaction to apixaban, other medicines, foods, dyes, or preservatives    pregnant or trying to get pregnant    breast-feeding  How should I use this medicine?  Take this medicine by mouth with a glass of water. Follow the directions on the prescription label. You can take it with or without food. If it upsets your stomach, take it with food. Take your medicine at regular intervals. Do not take it more often than directed. Do not stop taking except on your doctor's advice. Stopping this medicine may increase your risk of a blot clot. Be sure to refill your prescription before you run out of medicine.  Talk to your pediatrician regarding the use of this medicine in children. Special care may be needed.  Overdosage: If you think you have taken too much of this medicine contact a poison control center or emergency room at once.  NOTE: This medicine is only for you. Do not share this medicine with others.  What if I miss a dose?  If you miss a dose, take it as soon as you can. If it is almost time for your next dose, take only that dose. Do not take double or extra doses.  What may interact with this medicine?  This medicine may interact with the  following:    aspirin and aspirin-like medicines    certain medicines for fungal infections like ketoconazole and itraconazole    certain medicines for seizures like carbamazepine and phenytoin    certain medicines that treat or prevent blood clots like warfarin, enoxaparin, and dalteparin    clarithromycin    NSAIDs, medicines for pain and inflammation, like ibuprofen or naproxen    rifampin    ritonavir    Wayland's wort  This list may not describe all possible interactions. Give your health care provider a list of all the medicines, herbs, non-prescription drugs, or dietary supplements you use. Also tell them if you smoke, drink alcohol, or use illegal drugs. Some items may interact with your medicine.  What should I watch for while using this medicine?  Notify your doctor or health care professional and seek emergency treatment if you develop breathing problems; changes in vision; chest pain; severe, sudden headache; pain, swelling, warmth in the leg; trouble speaking; sudden numbness or weakness of the face, arm, or leg. These can be signs that your condition has gotten worse.  If you are going to have surgery, tell your doctor or health care professional that you are taking this medicine.  Tell your health care professional that you use this medicine before you have a spinal or epidural procedure. Sometimes people who take this medicine have bleeding problems around the spine when they have a spinal or epidural procedure. This bleeding is very rare. If you have a spinal or epidural procedure while on this medicine, call your health care professional immediately if you have back pain, numbness or tingling (especially in your legs and feet), muscle weakness, paralysis, or loss of bladder or bowel control.  Avoid sports and activities that might cause injury while you are using this medicine. Severe falls or injuries can cause unseen bleeding. Be careful when using sharp tools or knives. Consider using an  electric razor. Take special care brushing or flossing your teeth. Report any injuries, bruising, or red spots on the skin to your doctor or health care professional.  What side effects may I notice from receiving this medicine?  Side effects that you should report to your doctor or health care professional as soon as possible:    allergic reactions like skin rash, itching or hives, swelling of the face, lips, or tongue    signs and symptoms of bleeding such as bloody or black, tarry stools; red or dark-brown urine; spitting up blood or brown material that looks like coffee grounds; red spots on the skin; unusual bruising or bleeding from the eye, gums, or nose  This list may not describe all possible side effects. Call your doctor for medical advice about side effects. You may report side effects to FDA at 6-645-FDA-9834.  Where should I keep my medicine?  Keep out of the reach of children.  Store at room temperature between 20 and 25 degrees C (68 and 77 degrees F). Throw away any unused medicine after the expiration date.  NOTE: This sheet is a summary. It may not cover all possible information. If you have questions about this medicine, talk to your doctor, pharmacist, or health care provider.  NOTE:This sheet is a summary. It may not cover all possible information. If you have questions about this medicine, talk to your doctor, pharmacist, or health care provider. Copyright  2016 Gold Standard

## 2021-12-06 NOTE — DISCHARGE SUMMARY
ADDENDUM: patient given copy of discharge summary given the incidental findings and recommendations for followup.     [facility]    Discharge Summary  Hospital Medicine    Date of Admission:  12/2/2021  Date of Discharge:  12/6/2021   Discharging Provider: Morgan Thurman  Date of Service: 12/6/2021      Primary Care     System, Provider Not In      Identification and Chief Compaint: Steffanie Wan is a 56 year old female who presented on 12/2/2021 with complaint of malaise.    Discharge Diagnoses       Hypoxia    Infection due to 2019 novel coronavirus    Acute respiratory failure due to COVID-19 (H)    * No resolved hospital problems. *    Discharge Disposition   Discharged to home    Discharge Orders      COVID-19 [facility] Referral      Reason for your hospital stay    COVID-19     Contact provider    Contact your primary care provider if If increased trouble breathing, new arm/leg swelling, dizziness/passing out, falls, bleeding that doesn't stop, or uncontrolled pain.     Follow-up and recommended labs and tests     You should follow-up with your primary when you get back to [place]. Please note that here in the hospital you did seem to have some low oxygen levels when you slept which can be a sign of a condition called obstructive sleep apnea. Consider getting a sleep study when you get home to evaluate this further.    If you have any problems while you are here in [place], [facility] is the closest. Their number is [phone].     Discharge - Quarantine/Isolation Instruction    Date of symptom onset:  11/29/2021  Date of first positive test:   12/2/2021  Last day of quarantine 12/18/2021  Stay home and away from others (self-isolate) for at least 20 days from when your symptoms started And...   You've had no fevers and no medicine that reduces fever for 1 full day (24 hours)  And...   Your other symptoms have resolved (gotten  better).     Activity    Your activity upon discharge: activity as tolerated     Diet    Follow this diet upon discharge: Regular diet        Discharge Medications   Current Discharge Medication List      START taking these medications    Details   apixaban ANTICOAGULANT (ELIQUIS) 2.5 MG tablet Take 1 tablet (2.5 mg) by mouth 2 times daily  Qty: 60 tablet, Refills: 0    Associated Diagnoses: Infection due to 2019 novel coronavirus; Acute respiratory failure due to COVID-19 (H)         CONTINUE these medications which have NOT CHANGED    Details   atorvastatin (LIPITOR) 20 MG tablet Take 20 mg by mouth At Bedtime      levothyroxine (SYNTHROID/LEVOTHROID) 100 MCG tablet Take 100 mcg by mouth daily      timolol maleate (TIMOPTIC) 0.25 % ophthalmic solution Place 1 drop into both eyes 2 times daily           Allergies   Allergies   Allergen Reactions     Codeine Headache and Nausea and Vomiting     Msg [Monosodium Glutamate] Headache and Nausea and Vomiting     Plano Oil [Fish Oil] Headache and Nausea and Vomiting     Consultations This Hospital Stay   No consultations were requested during this admission    None    Significant Results and Procedures   Procedures    None    Data   Results for orders placed or performed during the hospital encounter of 12/02/21   XR Chest Port 1 View    Narrative    EXAM: XR CHEST PORT 1 VIEW  LOCATION: [facility]  DATE/TIME: 12/2/2021 9:08 PM    INDICATION: sob, covid positive  COMPARISON: None.      Impression    IMPRESSION: Heart size and pulmonary vascularity normal. Ill-defined nodular and hazy opacities both upper and lower lung fields concerning for pneumonia, including Covid 19. No effusions.   CT Chest Pulmonary Embolism w Contrast    Narrative    EXAM: CT CHEST PULMONARY EMBOLISM W CONTRAST  LOCATION: [facility]  DATE/TIME: 12/2/2021 9:53 PM    INDICATION: PE suspected, low/intermediate prob, positive D-dimer  COMPARISON:  None.  TECHNIQUE: CT chest pulmonary angiogram during arterial phase injection of IV contrast. Multiplanar reformats and MIP reconstructions were performed. Dose reduction techniques were used.   CONTRAST: 96 ml Isovue 370    FINDINGS:  ANGIOGRAM CHEST: No evidence for pulmonary embolism. Pulmonary arteries normal in caliber. Thoracic aorta normal in caliber. No aortic dissection or other acute abnormality.    HEART: Cardiac chambers within normal limits. No pericardial effusion. No coronary artery calcification.    MEDIASTINUM: No adenopathy or mass.    LUNGS AND PLEURA: Moderate scattered lobular and subsegmental groundglass densities throughout the lungs, becoming slightly more confluent at the lung bases. Numerous scattered pulmonary cysts also randomly distributed throughout the lungs. No   pneumothorax. No pleural effusion.    LIMITED UPPER ABDOMEN: Hepatic steatosis. Mild hepatomegaly.    MUSCULOSKELETAL: Negative.      Impression    IMPRESSION:  1.  No evidence for pulmonary embolism.   2.  Moderate Covid 19 pneumonia.  3.  Numerous pulmonary cysts. 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 Consider follow-up pulmonary consultation.       History of Present Illness   (From H&P) This is a 56-year-old female with a pertinent history of obesity hyperlipidemia hypothyroidism who presents to the emergency department with flulike symptoms since Monday.     Specifically patient states she is visiting her family here and lives in [place] and is a professor at the [employer] and has developed flulike symptoms since Monday.  She states she has been having progressively worsening shortness of breath with some dyspnea on exertion, cough that is dry in nature, congestion, body aches, nausea and decreased overall appetite.  She has been working remotely from home and wearing her mask and trying to comply with all precautionary standards.  She has not received her flu vaccine or her  Covid vaccine.  She has not had coronavirus in the past and has not received any monoclonal antibodies.  She does not smoke.     Patient presented to our emergency department on December 2 and on presentation her blood pressure was 108/54, temperature 99  F, heart rate 86, respiratory 20 with an O2 sat of 96%.  Her Covid was positive.  During her time in the emergency department she became hypoxic and required 3 L of supplemental oxygen.  It appears her O2 sats dropped down to 87%.  She was holding in the emergency department secondary to bed availability.  IV dexamethasone and enoxaparin therapeutically was started.       Hospital Course    56-year-old female history obesity, hypothyroidism and hyperlipidemia admitted to our hospital 12/2/2021 through 12/6/2021 with COVID-19 pneumonia.     # Confirmed COVID-19 infection    # Acute Hypoxic Respiratory Failure secondary to COVID-19 infection  # Viral Pneumonia secondary to COVID-19 infection     Symptom Onset Monday 11/29   Date of 1st Positive Test 12/2   Vaccination Status Unvaccinated.            CT scan with moderate bilateral groundglass opacities consistent with COVID-19 pneumonia.  CRP initially 90 down to 22 by day of discharge.  Ferritin of 1036.  Oxygen levels peaked at 3 to 4 L/min and she improved to room air by day of discharge.  She did have some mild desaturation down to 86% while sleeping, see below.  She was treated with course of dexamethasone and remdesivir here in the hospital.  -Extended VTE prophylaxis with apixaban 2.5 mg twice daily x1 month although if back to normal activity after 2 weeks probably could discontinue  -Discussed quarantine through December 18 to complete 20 days.  -Discussed need for COVID-19 vaccination later this winter.    Pulmonary cysts  Incidental finding on CT scan.  Picture included below.  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    --Will check VEGF levels (pending at discharge)   -- patient would  benefit from formal pulmonology consultation when she gets back to [place].         MARICHUY without history of CKD  Creatinine 1.39 on admission, improved to 0.72 by discharge.    Hypoxia while sleeping  Suspect may have undiagnosed obstructive sleep apnea.  -Needs sleep study    HLD: continue PTA med     HYPO T4: Continue meds        Pending Results   Unresulted Labs Ordered in the Past 30 Days of this Admission     No orders found from 11/2/2021 to 12/3/2021.          Physical Exam   Temp:  [97.7  F (36.5  C)-98.4  F (36.9  C)] 97.7  F (36.5  C)  Pulse:  [60-76] 60  Resp:  [16-24] 22  BP: (110-120)/(43-63) 113/53  SpO2:  [91 %-97 %] 93 %  Vitals:    12/03/21 0248 12/03/21 0249 12/04/21 0015   Weight: 144.8 kg (319 lb 3.2 oz) (!) 167.8 kg (370 lb) (!) 169.1 kg (372 lb 12.8 oz)       Constitutional: well appearing no acute distress  CV: Regular  Respiratory: CTA bilaterally  GI: Soft, nontender  Skin: Warm and dry      The discharge plan was discussed with the patient    Total time on this discharge was 45 minutes.    Morgan Thurman MD

## 2021-12-30 LAB — VEGF SERPL-MCNC: 89 PG/ML
